# Patient Record
Sex: FEMALE | Race: BLACK OR AFRICAN AMERICAN | Employment: FULL TIME | URBAN - METROPOLITAN AREA
[De-identification: names, ages, dates, MRNs, and addresses within clinical notes are randomized per-mention and may not be internally consistent; named-entity substitution may affect disease eponyms.]

---

## 2020-01-02 ENCOUNTER — HOSPITAL ENCOUNTER (EMERGENCY)
Age: 25
Discharge: HOME | End: 2020-01-02
Attending: EMERGENCY MEDICINE
Payer: MEDICAID

## 2020-01-02 VITALS
TEMPERATURE: 97.8 F | HEART RATE: 74 BPM | RESPIRATION RATE: 16 BRPM | DIASTOLIC BLOOD PRESSURE: 95 MMHG | OXYGEN SATURATION: 100 % | WEIGHT: 287 LBS | BODY MASS INDEX: 46.12 KG/M2 | SYSTOLIC BLOOD PRESSURE: 133 MMHG | HEIGHT: 66 IN

## 2020-01-02 DIAGNOSIS — R20.0 LEFT ARM NUMBNESS: ICD-10-CM

## 2020-01-02 DIAGNOSIS — R20.2 PARESTHESIA: Primary | ICD-10-CM

## 2020-01-02 LAB
ALBUMIN SERPL-MCNC: 3.2 G/DL (ref 3.4–5)
ALBUMIN/GLOB SERPL: 1 {RATIO} (ref 0.8–1.7)
ALP SERPL-CCNC: 75 U/L (ref 45–117)
ALT SERPL-CCNC: 22 U/L (ref 13–56)
AMPHET UR QL SCN: NEGATIVE
ANION GAP SERPL CALC-SCNC: 5 MMOL/L (ref 3–18)
APPEARANCE UR: CLEAR
AST SERPL-CCNC: 20 U/L (ref 10–38)
BACTERIA URNS QL MICRO: ABNORMAL /HPF
BARBITURATES UR QL SCN: NEGATIVE
BASOPHILS # BLD: 0 K/UL (ref 0–0.1)
BASOPHILS NFR BLD: 0 % (ref 0–2)
BENZODIAZ UR QL: NEGATIVE
BILIRUB SERPL-MCNC: 0.3 MG/DL (ref 0.2–1)
BILIRUB UR QL: NEGATIVE
BUN SERPL-MCNC: 14 MG/DL (ref 7–18)
BUN/CREAT SERPL: 15 (ref 12–20)
CALCIUM SERPL-MCNC: 8.6 MG/DL (ref 8.5–10.1)
CANNABINOIDS UR QL SCN: POSITIVE
CHLORIDE SERPL-SCNC: 108 MMOL/L (ref 100–111)
CK MB CFR SERPL CALC: 0.6 % (ref 0–4)
CK MB SERPL-MCNC: 1.4 NG/ML (ref 5–25)
CK SERPL-CCNC: 232 U/L (ref 26–192)
CO2 SERPL-SCNC: 27 MMOL/L (ref 21–32)
COCAINE UR QL SCN: NEGATIVE
COLOR UR: YELLOW
CREAT SERPL-MCNC: 0.91 MG/DL (ref 0.6–1.3)
DIFFERENTIAL METHOD BLD: ABNORMAL
EOSINOPHIL # BLD: 0.1 K/UL (ref 0–0.4)
EOSINOPHIL NFR BLD: 1 % (ref 0–5)
EPITH CASTS URNS QL MICRO: ABNORMAL /LPF (ref 0–5)
ERYTHROCYTE [DISTWIDTH] IN BLOOD BY AUTOMATED COUNT: 14.7 % (ref 11.6–14.5)
GLOBULIN SER CALC-MCNC: 3.3 G/DL (ref 2–4)
GLUCOSE SERPL-MCNC: 92 MG/DL (ref 74–99)
GLUCOSE UR STRIP.AUTO-MCNC: NEGATIVE MG/DL
HCG UR QL: NEGATIVE
HCT VFR BLD AUTO: 41.3 % (ref 35–45)
HDSCOM,HDSCOM: ABNORMAL
HGB BLD-MCNC: 13 G/DL (ref 12–16)
HGB UR QL STRIP: NEGATIVE
KETONES UR QL STRIP.AUTO: NEGATIVE MG/DL
LEUKOCYTE ESTERASE UR QL STRIP.AUTO: NEGATIVE
LYMPHOCYTES # BLD: 2.6 K/UL (ref 0.9–3.6)
LYMPHOCYTES NFR BLD: 36 % (ref 21–52)
MAGNESIUM SERPL-MCNC: 2 MG/DL (ref 1.6–2.6)
MCH RBC QN AUTO: 27.3 PG (ref 24–34)
MCHC RBC AUTO-ENTMCNC: 31.5 G/DL (ref 31–37)
MCV RBC AUTO: 86.6 FL (ref 74–97)
METHADONE UR QL: NEGATIVE
MONOCYTES # BLD: 0.7 K/UL (ref 0.05–1.2)
MONOCYTES NFR BLD: 10 % (ref 3–10)
MUCOUS THREADS URNS QL MICRO: ABNORMAL /LPF
NEUTS SEG # BLD: 3.8 K/UL (ref 1.8–8)
NEUTS SEG NFR BLD: 53 % (ref 40–73)
NITRITE UR QL STRIP.AUTO: NEGATIVE
OPIATES UR QL: NEGATIVE
PCP UR QL: NEGATIVE
PH UR STRIP: 6.5 [PH] (ref 5–8)
PLATELET # BLD AUTO: 257 K/UL (ref 135–420)
PMV BLD AUTO: 10.9 FL (ref 9.2–11.8)
POTASSIUM SERPL-SCNC: 4.1 MMOL/L (ref 3.5–5.5)
PROT SERPL-MCNC: 6.5 G/DL (ref 6.4–8.2)
PROT UR STRIP-MCNC: ABNORMAL MG/DL
RBC # BLD AUTO: 4.77 M/UL (ref 4.2–5.3)
RBC #/AREA URNS HPF: NEGATIVE /HPF (ref 0–5)
SODIUM SERPL-SCNC: 140 MMOL/L (ref 136–145)
SP GR UR REFRACTOMETRY: >1.03 (ref 1–1.03)
TROPONIN I SERPL-MCNC: <0.02 NG/ML (ref 0–0.04)
UROBILINOGEN UR QL STRIP.AUTO: 1 EU/DL (ref 0.2–1)
WBC # BLD AUTO: 7.2 K/UL (ref 4.6–13.2)
WBC URNS QL MICRO: ABNORMAL /HPF (ref 0–5)

## 2020-01-02 PROCEDURE — 81025 URINE PREGNANCY TEST: CPT

## 2020-01-02 PROCEDURE — 93005 ELECTROCARDIOGRAM TRACING: CPT

## 2020-01-02 PROCEDURE — 85025 COMPLETE CBC W/AUTO DIFF WBC: CPT

## 2020-01-02 PROCEDURE — 80053 COMPREHEN METABOLIC PANEL: CPT

## 2020-01-02 PROCEDURE — 81001 URINALYSIS AUTO W/SCOPE: CPT

## 2020-01-02 PROCEDURE — 83735 ASSAY OF MAGNESIUM: CPT

## 2020-01-02 PROCEDURE — 82550 ASSAY OF CK (CPK): CPT

## 2020-01-02 PROCEDURE — 74011250636 HC RX REV CODE- 250/636: Performed by: EMERGENCY MEDICINE

## 2020-01-02 PROCEDURE — 99285 EMERGENCY DEPT VISIT HI MDM: CPT

## 2020-01-02 PROCEDURE — 80307 DRUG TEST PRSMV CHEM ANLYZR: CPT

## 2020-01-02 PROCEDURE — 96374 THER/PROPH/DIAG INJ IV PUSH: CPT

## 2020-01-02 RX ORDER — DEXAMETHASONE SODIUM PHOSPHATE 4 MG/ML
10 INJECTION, SOLUTION INTRA-ARTICULAR; INTRALESIONAL; INTRAMUSCULAR; INTRAVENOUS; SOFT TISSUE ONCE
Status: COMPLETED | OUTPATIENT
Start: 2020-01-02 | End: 2020-01-02

## 2020-01-02 RX ADMIN — DEXAMETHASONE SODIUM PHOSPHATE 10 MG: 4 INJECTION, SOLUTION INTRAMUSCULAR; INTRAVENOUS at 23:18

## 2020-01-03 LAB
ATRIAL RATE: 77 BPM
CALCULATED P AXIS, ECG09: 43 DEGREES
CALCULATED R AXIS, ECG10: 54 DEGREES
CALCULATED T AXIS, ECG11: 4 DEGREES
DIAGNOSIS, 93000: NORMAL
P-R INTERVAL, ECG05: 146 MS
Q-T INTERVAL, ECG07: 388 MS
QRS DURATION, ECG06: 84 MS
QTC CALCULATION (BEZET), ECG08: 439 MS
VENTRICULAR RATE, ECG03: 77 BPM

## 2020-01-03 NOTE — ED PROVIDER NOTES
EMERGENCY DEPARTMENT HISTORY AND PHYSICAL EXAM    Date: 1/2/2020  Patient Name: Gee Farris    History of Presenting Illness     Chief Complaint   Patient presents with    Numbness     to left hand/forearm         History Provided By: Patient      Gee Farris is a 30 y.o. female who presents to the emergency department C/O numbness to left hand and forearm and left leg. 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 She states she did have some intermittent chest discomfort as well when the symptoms initially started but denies any shortness of breath. Denies any headache, weakness, fever, chills, abdominal pain, nausea, vomiting. States she had this problem about a year ago. No other complaints. PCP: None    Current Facility-Administered Medications   Medication Dose Route Frequency Provider Last Rate Last Dose    dexamethasone (DECADRON) 4 mg/mL injection 10 mg  10 mg IntraVENous ONCE Vitaly Doe, DO         Current Outpatient Medications   Medication Sig Dispense Refill    omeprazole (PRILOSEC) 20 mg capsule Take 1 Cap by mouth daily. 30 Cap 1    traMADol (ULTRAM) 50 mg tablet Take 1 Tab by mouth every six (6) hours as needed for Pain. Max Daily Amount: 200 mg. 12 Tab 0    amLODIPine (NORVASC) 5 mg tablet Take 1 Tab by mouth daily. 30 Tab 1    acetaminophen-codeine (TYLENOL-CODEINE #3) 300-30 mg per tablet Take 1 Tab by mouth every six (6) hours as needed for Pain. Max Daily Amount: 4 Tabs. 12 Tab 0       Past History     Past Medical History:  Past Medical History:   Diagnosis Date    Back problem     Hypertension     PIH    Ill-defined condition     positive tb skin tect X 2 in 2013       Past Surgical History:  History reviewed. No pertinent surgical history. Family History:  History reviewed. No pertinent family history.     Social History:  Social History     Tobacco Use    Smoking status: Current Every Day Smoker Types: Cigarettes, Cigars    Smokeless tobacco: Never Used   Substance Use Topics    Alcohol use: Yes     Alcohol/week: 0.0 standard drinks     Comment: twice a month     Drug use: Not Currently     Frequency: 1.0 times per week     Types: Marijuana       Allergies: Allergies   Allergen Reactions    Latex Rash         Review of Systems   Review of Systems   Constitutional: Negative for fever. Respiratory: Negative for shortness of breath. Cardiovascular: Negative for chest pain. Gastrointestinal: Negative for abdominal pain, nausea and vomiting. Musculoskeletal: Negative for arthralgias, back pain and myalgias. Neurological: Positive for numbness. Negative for dizziness, weakness, light-headedness and headaches.          Physical Exam     Vitals:    01/02/20 2143   BP: 129/87   Pulse: 65   Resp: 18   Temp: 97.8 °F (36.6 °C)   SpO2: 100%   Weight: 130.2 kg (287 lb)   Height: 5' 6\" (1.676 m)     Physical Exam    Nursing notes and vital signs reviewed    Constitutional: Non toxic appearing, no acute distress, obese  HEENT: Normocephalic, Atraumatic, Pupils are equal, round, and reactive to light, EOMI  Cardiovascular: Regular rate and rhythm, no murmurs  Chest: Normal work of breathing and chest excursion bilaterally  Lungs: Clear to ausculation bilaterally  Abdomen: Soft, non tender, non distended, normoactive bowel sounds  Back: No evidence of trauma or deformity  Extremities: No evidence of trauma or deformity, no LE edema  Skin: Warm and dry, normal cap refill  Neuro: Alert and oriented x 3, CN intact, normal speech, strength full and symmetric bilaterally, normal coordination, subjective decreased sensation to the ulnar aspect of the left elbow, forearm and the fifth and fourth digit, subjective decrease sensation to the left foot on the lateral aspect up to the popliteal region but no tenderness to these regions  Psychiatric: Normal mood and affect      Diagnostic Study Results     Labs - Recent Results (from the past 72 hour(s))   URINALYSIS W/ RFLX MICROSCOPIC    Collection Time: 01/02/20 10:00 PM   Result Value Ref Range    Color YELLOW      Appearance CLEAR      Specific gravity >1.030 (H) 1.005 - 1.030    pH (UA) 6.5 5.0 - 8.0      Protein TRACE (A) NEG mg/dL    Glucose NEGATIVE  NEG mg/dL    Ketone NEGATIVE  NEG mg/dL    Bilirubin NEGATIVE  NEG      Blood NEGATIVE  NEG      Urobilinogen 1.0 0.2 - 1.0 EU/dL    Nitrites NEGATIVE  NEG      Leukocyte Esterase NEGATIVE  NEG     DRUG SCREEN, URINE    Collection Time: 01/02/20 10:00 PM   Result Value Ref Range    BENZODIAZEPINES NEGATIVE  NEG      BARBITURATES NEGATIVE  NEG      THC (TH-CANNABINOL) POSITIVE (A) NEG      OPIATES NEGATIVE  NEG      PCP(PHENCYCLIDINE) NEGATIVE  NEG      COCAINE NEGATIVE  NEG      AMPHETAMINES NEGATIVE  NEG      METHADONE NEGATIVE  NEG      HDSCOM (NOTE)    URINE MICROSCOPIC ONLY    Collection Time: 01/02/20 10:00 PM   Result Value Ref Range    WBC 0 to 1 0 - 5 /hpf    RBC NEGATIVE  0 - 5 /hpf    Epithelial cells FEW 0 - 5 /lpf    Bacteria FEW (A) NEG /hpf    Mucus FEW (A) NEG /lpf   CBC WITH AUTOMATED DIFF    Collection Time: 01/02/20 10:09 PM   Result Value Ref Range    WBC 7.2 4.6 - 13.2 K/uL    RBC 4.77 4.20 - 5.30 M/uL    HGB 13.0 12.0 - 16.0 g/dL    HCT 41.3 35.0 - 45.0 %    MCV 86.6 74.0 - 97.0 FL    MCH 27.3 24.0 - 34.0 PG    MCHC 31.5 31.0 - 37.0 g/dL    RDW 14.7 (H) 11.6 - 14.5 %    PLATELET 680 723 - 979 K/uL    MPV 10.9 9.2 - 11.8 FL    NEUTROPHILS 53 40 - 73 %    LYMPHOCYTES 36 21 - 52 %    MONOCYTES 10 3 - 10 %    EOSINOPHILS 1 0 - 5 %    BASOPHILS 0 0 - 2 %    ABS. NEUTROPHILS 3.8 1.8 - 8.0 K/UL    ABS. LYMPHOCYTES 2.6 0.9 - 3.6 K/UL    ABS. MONOCYTES 0.7 0.05 - 1.2 K/UL    ABS. EOSINOPHILS 0.1 0.0 - 0.4 K/UL    ABS.  BASOPHILS 0.0 0.0 - 0.1 K/UL    DF AUTOMATED     METABOLIC PANEL, COMPREHENSIVE    Collection Time: 01/02/20 10:09 PM   Result Value Ref Range    Sodium 140 136 - 145 mmol/L    Potassium 4.1 3.5 - 5.5 mmol/L    Chloride 108 100 - 111 mmol/L    CO2 27 21 - 32 mmol/L    Anion gap 5 3.0 - 18 mmol/L    Glucose 92 74 - 99 mg/dL    BUN 14 7.0 - 18 MG/DL    Creatinine 0.91 0.6 - 1.3 MG/DL    BUN/Creatinine ratio 15 12 - 20      GFR est AA >60 >60 ml/min/1.73m2    GFR est non-AA >60 >60 ml/min/1.73m2    Calcium 8.6 8.5 - 10.1 MG/DL    Bilirubin, total 0.3 0.2 - 1.0 MG/DL    ALT (SGPT) 22 13 - 56 U/L    AST (SGOT) 20 10 - 38 U/L    Alk. phosphatase 75 45 - 117 U/L    Protein, total 6.5 6.4 - 8.2 g/dL    Albumin 3.2 (L) 3.4 - 5.0 g/dL    Globulin 3.3 2.0 - 4.0 g/dL    A-G Ratio 1.0 0.8 - 1.7     CARDIAC PANEL,(CK, CKMB & TROPONIN)    Collection Time: 01/02/20 10:09 PM   Result Value Ref Range     (H) 26 - 192 U/L    CK - MB 1.4 <3.6 ng/ml    CK-MB Index 0.6 0.0 - 4.0 %    Troponin-I, QT <0.02 0.0 - 0.045 NG/ML   MAGNESIUM    Collection Time: 01/02/20 10:09 PM   Result Value Ref Range    Magnesium 2.0 1.6 - 2.6 mg/dL   EKG, 12 LEAD, INITIAL    Collection Time: 01/02/20 10:35 PM   Result Value Ref Range    Ventricular Rate 77 BPM    Atrial Rate 77 BPM    P-R Interval 146 ms    QRS Duration 84 ms    Q-T Interval 388 ms    QTC Calculation (Bezet) 439 ms    Calculated P Axis 43 degrees    Calculated R Axis 54 degrees    Calculated T Axis 4 degrees    Diagnosis       Normal sinus rhythm  Possible Left atrial enlargement  T wave abnormality, consider anterior ischemia  Abnormal ECG  When compared with ECG of 13-OCT-2016 05:13,  Vent.  rate has increased BY  26 BPM  QT has lengthened     HCG URINE, QL. - POC    Collection Time: 01/02/20 10:47 PM   Result Value Ref Range    Pregnancy test,urine (POC) NEGATIVE  NEG         Radiologic Studies -   No orders to display     CT Results  (Last 48 hours)    None        CXR Results  (Last 48 hours)    None          Medications given in the ED-  Medications   dexamethasone (DECADRON) 4 mg/mL injection 10 mg (has no administration in time range)         Medical Decision Making I am the first provider for this patient. I reviewed the vital signs, available nursing notes, past medical history, past surgical history, family history and social history. Vital Signs-Reviewed the patient's vital signs. Pulse Oximetry Analysis - 100% on room air     Cardiac Monitor:  Rate: 65 bpm  Rhythm: sinus    EKG interpretation: (Preliminary)  EKG read by Dr. Merlinda Pun 77 normal sinus rhythm, nonspecific T wave changes in the anterior and inferior leads, no ST changes    Records Reviewed: Nursing Notes    Procedures:  Procedures    ED Course:   Laboratory findings unremarkable. I discussed with the patient that her symptoms are likely due to paresthesias. Recommended to continue with supportive care and exercise and follow-up with her primary care physician for reevaluation otherwise come back to the emergency department if her symptoms worsen with weakness. She understands and agrees to plan    Diagnosis and Disposition       DISCHARGE NOTE:    Joshua Gilbert's  results have been reviewed with her. She has been counseled regarding her diagnosis, treatment, and plan. She verbally conveys understanding and agreement of the signs, symptoms, diagnosis, treatment and prognosis and additionally agrees to follow up as discussed. She also agrees with the care-plan and conveys that all of her questions have been answered. I have also provided discharge instructions for her that include: educational information regarding their diagnosis and treatment, and list of reasons why they would want to return to the ED prior to their follow-up appointment, should her condition change. She has been provided with education for proper emergency department utilization. CLINICAL IMPRESSION:    1. Paresthesia    2. Left arm numbness        PLAN:  1. D/C Home  2. Current Discharge Medication List        3.    Follow-up Information     Follow up With Specialties Details Why Contact Info    [facility]  Schedule an appointment as soon as possible for a visit  As needed [id number] [facility], [address] [address]    [facility] EMERGENCY DEPT Emergency Medicine Go to  If symptoms worsen [address] [place] [id number]  [phone]        _______________________________      Please note that this dictation was completed with [employer], the computer voice recognition software. Quite often unanticipated grammatical, syntax, homophones, and other interpretive errors are inadvertently transcribed by the computer software. Please disregard these errors. Please excuse any errors that have escaped final proofreading.

## 2020-01-03 NOTE — ED TRIAGE NOTES
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 Skin warm and dry. Respirations unlabored. Pt anxious.

## 2020-01-03 NOTE — ED NOTES
I have reviewed discharge instructions with the patient. The patient verbalized understanding. Patient armband removed and shredded  Patient d/c home in stable condition, no distress noted.

## 2022-03-18 PROBLEM — R20.0 LEFT ARM NUMBNESS: Status: ACTIVE | Noted: 2020-01-02

## 2022-03-19 PROBLEM — R20.2 PARESTHESIA: Status: ACTIVE | Noted: 2020-01-02

## 2022-06-07 ENCOUNTER — HOSPITAL ENCOUNTER (EMERGENCY)
Age: 27
Discharge: HOME | End: 2022-06-07
Attending: EMERGENCY MEDICINE
Payer: MEDICAID

## 2022-06-07 VITALS
DIASTOLIC BLOOD PRESSURE: 90 MMHG | HEIGHT: 66 IN | HEART RATE: 87 BPM | RESPIRATION RATE: 16 BRPM | OXYGEN SATURATION: 99 % | BODY MASS INDEX: 43.07 KG/M2 | SYSTOLIC BLOOD PRESSURE: 140 MMHG | TEMPERATURE: 97.3 F | WEIGHT: 268 LBS

## 2022-06-07 DIAGNOSIS — M79.604 RIGHT LEG PAIN: ICD-10-CM
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
## 2022-06-07 DIAGNOSIS — S39.012A STRAIN OF LUMBAR REGION, INITIAL ENCOUNTER: ICD-10-CM

## 2022-06-07 PROCEDURE — 99283 EMERGENCY DEPT VISIT LOW MDM: CPT

## 2022-06-07 RX ORDER — TIZANIDINE HYDROCHLORIDE 4 MG/1
CAPSULE, GELATIN COATED ORAL
Qty: 20 CAPSULE | Refills: 0 | Status: SHIPPED | OUTPATIENT
Start: 2022-06-07

## 2022-06-07 RX ORDER — NAPROXEN 500 MG/1
500 TABLET ORAL 2 TIMES DAILY WITH MEALS
Qty: 20 TABLET | Refills: 0 | Status: SHIPPED | OUTPATIENT
Start: 2022-06-07 | End: 2022-06-17

## 2022-06-07 NOTE — DISCHARGE INSTRUCTIONS
Rest.  Expect to be sore for couple days  Work note  Medication as prescribed for pain/inflammation as well as muscle spasm. Make sure that you call your primary care provider for follow-up care  Worse?   Return to ER
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
## 2022-06-07 NOTE — Clinical Note
[facility] [place]  [facility] EMERGENCY DEPT  [address] [facility] [address]  [phone]    Work/School Note    Date: 6/7/2022    To Whom It May concern:    Jad Barton was seen and treated today in the emergency room by the following provider(s):  Attending Provider: Nathan Feng MD  Physician Assistant: SRUTHI Biswas. Carmelalavelle Feng is excused from work/school on 06/07/22 and 06/08/22. She is medically clear to return to work/school on 6/9/2022.        Sincerely,          SRUTHI Couch

## 2022-07-23 ENCOUNTER — APPOINTMENT (OUTPATIENT)
Dept: GENERAL RADIOLOGY | Age: 27
End: 2022-07-23
Attending: STUDENT IN AN ORGANIZED HEALTH CARE EDUCATION/TRAINING PROGRAM
Payer: MEDICAID

## 2022-07-23 ENCOUNTER — HOSPITAL ENCOUNTER (EMERGENCY)
Age: 27
Discharge: HOME | End: 2022-07-23
Attending: STUDENT IN AN ORGANIZED HEALTH CARE EDUCATION/TRAINING PROGRAM
Payer: MEDICAID

## 2022-07-23 VITALS
RESPIRATION RATE: 22 BRPM | TEMPERATURE: 98.4 F | SYSTOLIC BLOOD PRESSURE: 129 MMHG | DIASTOLIC BLOOD PRESSURE: 75 MMHG | OXYGEN SATURATION: 99 % | HEART RATE: 108 BPM

## 2022-07-23 DIAGNOSIS — J45.901 MODERATE ASTHMA WITH ACUTE EXACERBATION, UNSPECIFIED WHETHER PERSISTENT: Primary | ICD-10-CM

## 2022-07-23 LAB
AMORPH CRY URNS QL MICRO: ABNORMAL
ANION GAP SERPL CALC-SCNC: 7 MMOL/L (ref 3–18)
APPEARANCE UR: CLEAR
BACTERIA URNS QL MICRO: NEGATIVE /HPF
BASOPHILS # BLD: 0 K/UL (ref 0–0.1)
BASOPHILS NFR BLD: 1 % (ref 0–2)
BILIRUB UR QL: NEGATIVE
BUN SERPL-MCNC: 10 MG/DL (ref 7–18)
BUN/CREAT SERPL: 12 (ref 12–20)
CALCIUM SERPL-MCNC: 7.9 MG/DL (ref 8.5–10.1)
CHLORIDE SERPL-SCNC: 111 MMOL/L (ref 100–111)
CO2 SERPL-SCNC: 23 MMOL/L (ref 21–32)
COLOR UR: YELLOW
CREAT SERPL-MCNC: 0.84 MG/DL (ref 0.6–1.3)
D DIMER PPP FEU-MCNC: 0.31 UG/ML(FEU)
DIFFERENTIAL METHOD BLD: ABNORMAL
EOSINOPHIL # BLD: 0.1 K/UL (ref 0–0.4)
EOSINOPHIL NFR BLD: 2 % (ref 0–5)
EPITH CASTS URNS QL MICRO: ABNORMAL /LPF (ref 0–5)
ERYTHROCYTE [DISTWIDTH] IN BLOOD BY AUTOMATED COUNT: 13.8 % (ref 11.6–14.5)
GLUCOSE SERPL-MCNC: 97 MG/DL (ref 74–99)
GLUCOSE UR STRIP.AUTO-MCNC: NEGATIVE MG/DL
HCG UR QL: NEGATIVE
HCT VFR BLD AUTO: 37.5 % (ref 35–45)
HGB BLD-MCNC: 12.2 G/DL (ref 12–16)
HGB UR QL STRIP: ABNORMAL
IMM GRANULOCYTES # BLD AUTO: 0 K/UL (ref 0–0.04)
IMM GRANULOCYTES NFR BLD AUTO: 0 % (ref 0–0.5)
KETONES UR QL STRIP.AUTO: NEGATIVE MG/DL
LEUKOCYTE ESTERASE UR QL STRIP.AUTO: NEGATIVE
LYMPHOCYTES # BLD: 2.5 K/UL (ref 0.9–3.6)
LYMPHOCYTES NFR BLD: 53 % (ref 21–52)
MCH RBC QN AUTO: 27.4 PG (ref 24–34)
MCHC RBC AUTO-ENTMCNC: 32.5 G/DL (ref 31–37)
MCV RBC AUTO: 84.1 FL (ref 78–100)
MONOCYTES # BLD: 0.4 K/UL (ref 0.05–1.2)
MONOCYTES NFR BLD: 9 % (ref 3–10)
NEUTS SEG # BLD: 1.6 K/UL (ref 1.8–8)
NEUTS SEG NFR BLD: 35 % (ref 40–73)
NITRITE UR QL STRIP.AUTO: NEGATIVE
NRBC # BLD: 0 K/UL (ref 0–0.01)
NRBC BLD-RTO: 0 PER 100 WBC
PH UR STRIP: 8 [PH] (ref 5–8)
PLATELET # BLD AUTO: 191 K/UL (ref 135–420)
PMV BLD AUTO: 11.5 FL (ref 9.2–11.8)
POTASSIUM SERPL-SCNC: 4.2 MMOL/L (ref 3.5–5.5)
PROT UR STRIP-MCNC: ABNORMAL MG/DL
RBC # BLD AUTO: 4.46 M/UL (ref 4.2–5.3)
RBC #/AREA URNS HPF: ABNORMAL /HPF (ref 0–5)
SODIUM SERPL-SCNC: 141 MMOL/L (ref 136–145)
SP GR UR REFRACTOMETRY: 1.02 (ref 1–1.03)
UROBILINOGEN UR QL STRIP.AUTO: 1 EU/DL (ref 0.2–1)
WBC # BLD AUTO: 4.7 K/UL (ref 4.6–13.2)
WBC URNS QL MICRO: ABNORMAL /HPF (ref 0–4)

## 2022-07-23 PROCEDURE — 74011000258 HC RX REV CODE- 258: Performed by: STUDENT IN AN ORGANIZED HEALTH CARE EDUCATION/TRAINING PROGRAM

## 2022-07-23 PROCEDURE — 93005 ELECTROCARDIOGRAM TRACING: CPT

## 2022-07-23 PROCEDURE — 71045 X-RAY EXAM CHEST 1 VIEW: CPT

## 2022-07-23 PROCEDURE — 81025 URINE PREGNANCY TEST: CPT

## 2022-07-23 PROCEDURE — 74011250636 HC RX REV CODE- 250/636: Performed by: STUDENT IN AN ORGANIZED HEALTH CARE EDUCATION/TRAINING PROGRAM

## 2022-07-23 PROCEDURE — 85379 FIBRIN DEGRADATION QUANT: CPT

## 2022-07-23 PROCEDURE — 74011250637 HC RX REV CODE- 250/637: Performed by: STUDENT IN AN ORGANIZED HEALTH CARE EDUCATION/TRAINING PROGRAM

## 2022-07-23 PROCEDURE — 85025 COMPLETE CBC W/AUTO DIFF WBC: CPT

## 2022-07-23 PROCEDURE — 94762 N-INVAS EAR/PLS OXIMTRY CONT: CPT

## 2022-07-23 PROCEDURE — 74011000250 HC RX REV CODE- 250: Performed by: STUDENT IN AN ORGANIZED HEALTH CARE EDUCATION/TRAINING PROGRAM

## 2022-07-23 PROCEDURE — 99285 EMERGENCY DEPT VISIT HI MDM: CPT

## 2022-07-23 PROCEDURE — 96361 HYDRATE IV INFUSION ADD-ON: CPT

## 2022-07-23 PROCEDURE — 81001 URINALYSIS AUTO W/SCOPE: CPT

## 2022-07-23 PROCEDURE — 94640 AIRWAY INHALATION TREATMENT: CPT

## 2022-07-23 PROCEDURE — 96374 THER/PROPH/DIAG INJ IV PUSH: CPT

## 2022-07-23 PROCEDURE — 80048 BASIC METABOLIC PNL TOTAL CA: CPT

## 2022-07-23 RX ORDER — ACETAMINOPHEN 500 MG
1000 TABLET ORAL
Status: COMPLETED | OUTPATIENT
Start: 2022-07-23 | End: 2022-07-23

## 2022-07-23 RX ORDER — DEXAMETHASONE SODIUM PHOSPHATE 4 MG/ML
12 INJECTION, SOLUTION INTRA-ARTICULAR; INTRALESIONAL; INTRAMUSCULAR; INTRAVENOUS; SOFT TISSUE
Status: DISCONTINUED | OUTPATIENT
Start: 2022-07-23 | End: 2022-07-23

## 2022-07-23 RX ORDER — IPRATROPIUM BROMIDE AND ALBUTEROL SULFATE 2.5; .5 MG/3ML; MG/3ML
3 SOLUTION RESPIRATORY (INHALATION)
Status: COMPLETED | OUTPATIENT
Start: 2022-07-23 | End: 2022-07-23

## 2022-07-23 RX ORDER — ALBUTEROL SULFATE 90 UG/1
2 AEROSOL, METERED RESPIRATORY (INHALATION)
Qty: 1 EACH | Refills: 2 | Status: SHIPPED | OUTPATIENT
Start: 2022-07-23 | End: 2022-07-28

## 2022-07-23 RX ORDER — PREDNISONE 50 MG/1
50 TABLET ORAL DAILY
Qty: 3 TABLET | Refills: 0 | Status: SHIPPED | OUTPATIENT
Start: 2022-07-23 | End: 2022-07-26

## 2022-07-23 RX ADMIN — DEXAMETHASONE SODIUM PHOSPHATE 12 MG: 4 INJECTION INTRA-ARTICULAR; INTRALESIONAL; INTRAMUSCULAR; INTRAVENOUS; SOFT TISSUE at 17:10

## 2022-07-23 RX ADMIN — ACETAMINOPHEN 1000 MG: 500 TABLET ORAL at 15:09

## 2022-07-23 RX ADMIN — SODIUM CHLORIDE 1000 ML: 900 INJECTION, SOLUTION INTRAVENOUS at 14:31

## 2022-07-23 RX ADMIN — IPRATROPIUM BROMIDE AND ALBUTEROL SULFATE 3 ML: .5; 3 SOLUTION RESPIRATORY (INHALATION) at 15:09

## 2022-07-23 NOTE — ED PROVIDER NOTES
64-year-old female with past medical history of asthma, hypertension currently attempting lifestyle modification rather than medication presented via EMS with chief complaint of shortness of breath. Patient endorsed waking at approximately 10 AM feeling slightly short of breath use her albuterol inhaler approximately 3 to 4 x 8 some food and became increasingly short of breath had a slow onset of headache she endorses is now a right-sided 9 out of 10 as well as 3 episodes nonbloody nausea vomiting during EMS ride. Patient was given Zofran via EMS and endorsed improvement in her nausea and vomiting. Patient stated the headache was not sudden or maximal in onset, denied associated neurological symptoms, is not the worst headache of her life, endorsed similar headaches with stress responses. She denied any other associated symptoms or complaints at this time. Past Medical History:   Diagnosis Date    Back problem     Hypertension     PIH    Ill-defined condition     positive tb skin tect X 2 in        No past surgical history on file. No family history on file. Social History     Socioeconomic History    Marital status: SINGLE     Spouse name: Not on file    Number of children: Not on file    Years of education: Not on file    Highest education level: Not on file   Occupational History    Not on file   Tobacco Use    Smoking status: Former     Types: Cigarettes, Cigars     Quit date: 2022     Years since quittin.5    Smokeless tobacco: Never   Substance and Sexual Activity    Alcohol use:  Yes     Alcohol/week: 0.0 standard drinks     Comment: twice a month     Drug use: Not Currently     Frequency: 1.0 times per week     Types: Marijuana    Sexual activity: Yes     Partners: Male   Other Topics Concern     Service No    Blood Transfusions No    Caffeine Concern Not Asked    Occupational Exposure No    Hobby Hazards No    Sleep Concern Yes    Stress Concern Yes  Weight Concern Yes    Special Diet No    Back Care No    Exercise Not Asked    Bike Helmet Not Asked    Seat Belt Not Asked    Self-Exams Not Asked   Social History Narrative    Not on file     Social Determinants of Health     Financial Resource Strain: Not on file   Food Insecurity: Not on file   Transportation Needs: Not on file   Physical Activity: Not on file   Stress: Not on file   Social Connections: Not on file   Intimate Partner Violence: Not on file   Housing Stability: Not on file         ALLERGIES: Latex    Review of Systems   Constitutional:  Negative for chills, diaphoresis and fever. HENT:  Negative for congestion and sore throat. Eyes:  Negative for visual disturbance. Respiratory:  Positive for shortness of breath. Negative for cough. Cardiovascular:  Negative for chest pain. Gastrointestinal:  Positive for nausea. Negative for abdominal pain, blood in stool, diarrhea and vomiting. Genitourinary:  Negative for difficulty urinating, dysuria, frequency and urgency. Musculoskeletal:  Negative for back pain. Skin:  Negative for rash. Neurological:  Positive for headaches. Negative for dizziness, syncope and light-headedness. Hematological:  Negative for adenopathy. Psychiatric/Behavioral:  Negative for confusion. Vitals:    07/23/22 1431 07/23/22 1446 07/23/22 1501 07/23/22 1531   BP:    122/71   Pulse: 95 92 96 (!) 118   Resp: 25 18 22 17   Temp:       SpO2: 99% 100%  100%            Physical Exam  Constitutional:       General: She is not in acute distress. Appearance: Normal appearance. HENT:      Head: Normocephalic and atraumatic. Nose: Nose normal.      Mouth/Throat:      Mouth: Mucous membranes are moist.      Pharynx: Oropharynx is clear. Eyes:      Extraocular Movements: Extraocular movements intact. Pupils: Pupils are equal, round, and reactive to light. Cardiovascular:      Rate and Rhythm: Normal rate and regular rhythm. Pulses: Normal pulses. Heart sounds: Normal heart sounds. Pulmonary:      Effort: Pulmonary effort is normal. No respiratory distress. Breath sounds: Normal breath sounds. Abdominal:      Palpations: Abdomen is soft. Tenderness: There is no abdominal tenderness. Musculoskeletal:         General: No swelling or tenderness. Normal range of motion. Cervical back: Neck supple. Skin:     General: Skin is warm and dry. Capillary Refill: Capillary refill takes less than 2 seconds. Findings: No rash. Neurological:      General: No focal deficit present. Mental Status: She is alert and oriented to person, place, and time. Cranial Nerves: Cranial nerves are intact. Sensory: Sensation is intact. Motor: Motor function is intact. Coordination: Coordination is intact. Gait: Gait is intact. Psychiatric:         Mood and Affect: Mood normal. Affect is tearful. MDM  Number of Diagnoses or Management Options  Moderate asthma with acute exacerbation, unspecified whether persistent  Diagnosis management comments:     2:08 PM  Vital signs notable for blood pressure of 141/92, heart rate in the 90s however patient became slightly tachycardic in the low 100s with standing. Vitals were otherwise within normal limits. Exam was notable for tearful obese female. Exam was otherwise unremarkable to include clear lung sounds bilaterally, and normal neuro exam.  Patient has past medical history of hypertension currently attempting lifestyle modifications rather than medications  Also has past medical history of asthma  Patient was given Zofran via EMS endorsed nausea improved  Patient endorsed currently feeling as though \"her breathing is not very good\"  I cannot PE RC the patient out due to her tachycardia with standing. Ordered CBC, BMP, UA, hCG, chest x-ray, ECG, D-dimer  I will give her 1 L IV fluids and DuoNeb at this time.     4:03 PM  On reassessment patient feels better after DuoNeb, gave her Tylenol p.o. for her headache  UA demonstrated some blood, patient endorsed being on her menstrual cycle currently  D-dimer normal and other labs were unremarkable. Chest x-ray no acute cardiopulmonary process. Will give the patient 12 of Decadron IV at this time  I discussed at length with her and I will prescribe her short course of prednisone as outpatient, and refill her albuterol for her  I discussed with her this was most likely asthma exacerbation and I will discharge her home with above medications, instructions to follow-up with her primary care provider and good return precautions for worsening or worrisome symptoms. She expressed understanding and was on board with the plan.        Amount and/or Complexity of Data Reviewed  Clinical lab tests: reviewed  Tests in the radiology section of CPT®: reviewed           Dr Yola Guardado MD  [facility] EM PGY4

## 2022-07-23 NOTE — Clinical Note
[facility] EMERGENCY DEPT  [id number] [address] [phone]    Work/School Note    Date: 7/23/2022    To Whom It May concern:      Jace Hudson was seen and treated today in the emergency room by the following provider(s):  Resident: Gray Ariza MD.      Jace Hudson is excused from work/school on 07/24/22. She is clear to return to work/school on 07/25/22.         Sincerely,          SRUTHI Adame

## 2022-07-23 NOTE — ED TRIAGE NOTES
Client from home,  reports having difficulty breathing since this am. Client used MDI with no results. Reports having HA enroute and n/v x 1, given zofran ODT enroute.

## 2022-07-24 LAB
ATRIAL RATE: 118 BPM
CALCULATED P AXIS, ECG09: 42 DEGREES
CALCULATED R AXIS, ECG10: 26 DEGREES
CALCULATED T AXIS, ECG11: -23 DEGREES
DIAGNOSIS, 93000: NORMAL
P-R INTERVAL, ECG05: 152 MS
Q-T INTERVAL, ECG07: 344 MS
QRS DURATION, ECG06: 94 MS
QTC CALCULATION (BEZET), ECG08: 482 MS
VENTRICULAR RATE, ECG03: 118 BPM

## 2023-07-24 ENCOUNTER — OFFICE VISIT (OUTPATIENT)
Age: 28
End: 2023-07-24
Payer: MEDICAID

## 2023-07-24 VITALS
DIASTOLIC BLOOD PRESSURE: 92 MMHG | WEIGHT: 293 LBS | HEIGHT: 66 IN | TEMPERATURE: 97.3 F | BODY MASS INDEX: 47.09 KG/M2 | SYSTOLIC BLOOD PRESSURE: 145 MMHG | OXYGEN SATURATION: 100 % | HEART RATE: 86 BPM

## 2023-07-24 DIAGNOSIS — E66.01 MORBID OBESITY WITH BMI OF 50.0-59.9, ADULT (HCC): ICD-10-CM

## 2023-07-24 DIAGNOSIS — K21.9 GASTROESOPHAGEAL REFLUX DISEASE, UNSPECIFIED WHETHER ESOPHAGITIS PRESENT: ICD-10-CM

## 2023-07-24 DIAGNOSIS — E66.01 MORBID OBESITY (HCC): ICD-10-CM

## 2023-07-24 DIAGNOSIS — J45.909 ASTHMA, UNSPECIFIED ASTHMA SEVERITY, UNSPECIFIED WHETHER COMPLICATED, UNSPECIFIED WHETHER PERSISTENT: ICD-10-CM

## 2023-07-24 DIAGNOSIS — I10 HYPERTENSION, UNSPECIFIED TYPE: ICD-10-CM

## 2023-07-24 DIAGNOSIS — E66.01 MORBID OBESITY (HCC): Primary | ICD-10-CM

## 2023-07-24 PROBLEM — M54.9 CHRONIC BACK PAIN: Status: ACTIVE | Noted: 2023-07-24

## 2023-07-24 PROBLEM — F41.9 ANXIETY: Status: ACTIVE | Noted: 2023-07-24

## 2023-07-24 PROBLEM — M41.9 SCOLIOSIS: Status: ACTIVE | Noted: 2023-07-24

## 2023-07-24 PROBLEM — G89.29 CHRONIC BACK PAIN: Status: ACTIVE | Noted: 2023-07-24

## 2023-07-24 PROBLEM — F32.A DEPRESSION: Status: ACTIVE | Noted: 2023-07-24

## 2023-07-24 PROBLEM — J30.2 SEASONAL ALLERGIES: Status: ACTIVE | Noted: 2023-07-24

## 2023-07-24 PROBLEM — F20.9 SCHIZOPHRENIA (HCC): Status: ACTIVE | Noted: 2023-07-24

## 2023-07-24 PROCEDURE — 3077F SYST BP >= 140 MM HG: CPT | Performed by: NURSE PRACTITIONER

## 2023-07-24 PROCEDURE — 3080F DIAST BP >= 90 MM HG: CPT | Performed by: SPECIALIST

## 2023-07-24 PROCEDURE — 99205 OFFICE O/P NEW HI 60 MIN: CPT | Performed by: NURSE PRACTITIONER

## 2023-07-24 RX ORDER — HALOPERIDOL 1 MG/1
1 TABLET ORAL 4 TIMES DAILY
COMMUNITY

## 2023-07-24 RX ORDER — MONTELUKAST SODIUM 10 MG/1
10 TABLET ORAL
COMMUNITY
Start: 2023-02-27

## 2023-07-24 RX ORDER — ALBUTEROL SULFATE 90 UG/1
2 AEROSOL, METERED RESPIRATORY (INHALATION)
COMMUNITY
Start: 2023-02-15

## 2023-07-24 RX ORDER — CETIRIZINE HYDROCHLORIDE 10 MG/1
10 TABLET ORAL DAILY
COMMUNITY
Start: 2023-02-27

## 2023-07-24 RX ORDER — OLOPATADINE HYDROCHLORIDE 1 MG/ML
1 SOLUTION/ DROPS OPHTHALMIC 2 TIMES DAILY
COMMUNITY
Start: 2023-02-27

## 2023-08-09 ENCOUNTER — HOSPITAL ENCOUNTER (OUTPATIENT)
Facility: HOSPITAL | Age: 28
Discharge: HOME | End: 2023-08-12

## 2023-08-09 NOTE — PROGRESS NOTES
Select at least 2 DIFFERENT protein supplements that can be used for protein supplementation to meet goals pre- and post-operatively. - Practice Carbohydrate Counting and label reading.   - Follow 3 g rule for fat and sugar. 2. Slow down meals  - Chew each bite 25-35 times. - Aim for 20-30 min/meal.  3. Increase non-caloric fluids to 64 oz per day. Eliminate caffeine, added sugar, carbonation, and straws.               - Continue to work to decrease sugar sweetened beverages - goal of calorie-free beverages only. - Must eliminate caffeine prior to surgery and avoid for ~6-8 weeks. - Practice 30:30 rule,  food and fluid intake. 4. Start activity regimen, work to increase ADLs. 5. Start Complete MVI and probiotic at least 30 days pre-op.

## 2023-08-10 VITALS — WEIGHT: 293 LBS | BODY MASS INDEX: 47.09 KG/M2 | HEIGHT: 66 IN

## 2023-08-16 ENCOUNTER — HOSPITAL ENCOUNTER (OUTPATIENT)
Facility: HOSPITAL | Age: 28
Setting detail: OUTPATIENT SURGERY
Discharge: HOME | End: 2023-08-19

## 2023-08-16 ENCOUNTER — HOSPITAL ENCOUNTER (OUTPATIENT)
Facility: HOSPITAL | Age: 28
Discharge: HOME | End: 2023-08-19

## 2023-08-16 VITALS
TEMPERATURE: 98.3 F | HEIGHT: 66 IN | HEART RATE: 88 BPM | BODY MASS INDEX: 53.75 KG/M2 | DIASTOLIC BLOOD PRESSURE: 93 MMHG | SYSTOLIC BLOOD PRESSURE: 131 MMHG | OXYGEN SATURATION: 100 % | RESPIRATION RATE: 20 BRPM

## 2023-08-16 DIAGNOSIS — E66.01 MORBID OBESITY (HCC): ICD-10-CM

## 2023-08-16 PROCEDURE — 74240 X-RAY XM UPR GI TRC 1CNTRST: CPT | Performed by: SPECIALIST

## 2023-08-16 PROCEDURE — 74220 X-RAY XM ESOPHAGUS 1CNTRST: CPT

## 2023-08-16 PROCEDURE — 2500000003 HC RX 250 WO HCPCS: Performed by: SPECIALIST

## 2023-08-16 PROCEDURE — 74240 X-RAY XM UPR GI TRC 1CNTRST: CPT

## 2023-08-16 RX ADMIN — BARIUM SULFATE 100 ML: 960 POWDER, FOR SUSPENSION ORAL at 14:10

## 2023-08-16 NOTE — PROCEDURES
Ghazala Singh   : 1995  Medical Record [id number]            PREPROCEDURE DIAGNOSIS: This patient is preoperative for laparoscopic sleeve gastrectomy procedure with a history of  reflux disease. POSTPROCEDURE DIAGNOSIS: This patient is preoperative for laparoscopic sleeve gastrectomy procedure with a history of  reflux disease. PROCEDURES PERFORMED: Upper GI study with barium. ESTIMATED BLOOD LOSS: None. SPECIMENS: None. STATEMENT OF MEDICAL NECESSITY: The patient is a patient with a  longstanding history of obesity. They are now considering the laparoscopic sleeve gastrectomy procedure as a means of surgical weight control and due to their history of reflux disease and are being assessed preoperatively for such. DESCRIPTION OF PROCEDURE: The patient was brought to the fluoroscopy unit and  was given thin barium. On swallowing of barium, they were noted to have  normal peristalsis of their esophagus. They had prompt filling of distal  esophagus with tapering into the gastroesophageal junction. There was no evidence of a hiatal hernia present. Contrast then filled the gastric cardia, fundus,body and pre pyloric region with no abnormalities noted. Contrast then exited the pylorus in normal fashion. No obstruction was noted. There was no evidence of reflux noted.     (normal anatomy)    Maureen Her MD

## 2023-08-16 NOTE — PROGRESS NOTES
[facility] UGI FOCUS NOTE      Date:  8/16/2023  Time:  1:52 PM    Patient:  Shelley Tyler  Procedure:  UGI      Patient Questions  Lap Band Adjustment Patient Questionnaire: If female, are you pregnant?  []Yes     [x]No  Tolerates thick liquids:  [x]Well   []1     []2     []3     []4     []5     []Poorly  Tolerates red meat:  []Well   [x]1     []2     []3     []4     []5     [] Poorly  Tolerates chicken:  [x]Well   []1     []2     []3     []4     []5     []Poorly  Tolerates fish:   [x]Well   []1     []2     []3     []4     []5     []Poorly  Hunger is:   []Well Controlled     []1     []2     [x]3     []4     []5      [] Poorly Controlled  Nightime regurgitation:  [x]Never     []1     []2     []3     []4     []5     []Frequently    Lap Band Info:  Band Type  []Realize     []Realize-C     []AP     []VG     []10cm     []Unknown  []Other      Comments:        Deisi Carlisle RN

## 2023-09-14 ENCOUNTER — HOSPITAL ENCOUNTER (OUTPATIENT)
Facility: HOSPITAL | Age: 28
Discharge: HOME | End: 2023-09-17

## 2023-09-14 VITALS — BODY MASS INDEX: 47.09 KG/M2 | HEIGHT: 66 IN | WEIGHT: 293 LBS

## 2023-10-12 ENCOUNTER — HOSPITAL ENCOUNTER (OUTPATIENT)
Facility: HOSPITAL | Age: 28
Discharge: HOME | End: 2023-10-15

## 2023-10-12 VITALS — WEIGHT: 293 LBS | BODY MASS INDEX: 47.09 KG/M2 | HEIGHT: 66 IN

## 2023-11-09 ENCOUNTER — HOSPITAL ENCOUNTER (OUTPATIENT)
Facility: HOSPITAL | Age: 28
Discharge: HOME | End: 2023-11-12

## 2023-11-09 VITALS — HEIGHT: 66 IN | WEIGHT: 293 LBS | BODY MASS INDEX: 47.09 KG/M2

## 2023-11-09 NOTE — PROGRESS NOTES
changes discussed for both before and after surgery. Today in class we reviewed the Key Diet Principles. Patient was encouraged to consume 3 meals each day, and meal timing was reviewed. Meal time behaviors that will help pt to be successful with their weight loss efforts were also discussed. We discussed the importance of drinking adequate amounts of fluids, recommending that patient consume a minimum of 64 oz of sugar-free, caffeine-free and carbonation-free fluids each day. Patient was encouraged to eliminate sugar-sweetened beverages such as sweet tea, fruit juice, fruit smoothies, flavored coffee drinks and regular sodas. During the weight loss trial, patient is encouraged to focus on eating protein-forward meals, with a daily goal of  grams protein. Patient was also advised to decrease carbohydrate intake to <100 g/d, choosing complex vs. simple carbohydrates in limited amounts. We also discussed limiting fat intake. Encouraged patient to follow the \"3-gram rule\" when choosing foods by looking for items containing <3 g of fat and sugar per serving. We reviewed meal planning guidelines and discussed appropriate meal and snack options. We also talked about appropriate protein drinks and patient was encouraged to start trying these, using them either for a meal replacement or a protein-rich snack pre-operatively. We reviewed the nutritional guidelines for selecting protein shakes. Comments: During today's class we talked about the importance of vitamins and minerals, both pre-operatively and post-operatively. Patient was instructed on:      Pre-operative vitamin and mineral supplementation requirements, according to the ASMBS guidelines:  Patient was instructed to choose a chewable complete multivitamin with iron in NON-gummy form. Selection of probiotic was reviewed.   The role of the following vitamins and minerals that pts need in their multi-vitamin:  Thiamin (Vitamin

## 2023-12-01 ENCOUNTER — OFFICE VISIT (OUTPATIENT)
Age: 28
End: 2023-12-01
Payer: MEDICAID

## 2023-12-01 VITALS
OXYGEN SATURATION: 100 % | WEIGHT: 293 LBS | TEMPERATURE: 97.3 F | DIASTOLIC BLOOD PRESSURE: 72 MMHG | BODY MASS INDEX: 47.09 KG/M2 | SYSTOLIC BLOOD PRESSURE: 126 MMHG | HEART RATE: 68 BPM | HEIGHT: 66 IN

## 2023-12-01 DIAGNOSIS — J45.909 ASTHMA, UNSPECIFIED ASTHMA SEVERITY, UNSPECIFIED WHETHER COMPLICATED, UNSPECIFIED WHETHER PERSISTENT: ICD-10-CM

## 2023-12-01 DIAGNOSIS — K21.9 GASTROESOPHAGEAL REFLUX DISEASE, UNSPECIFIED WHETHER ESOPHAGITIS PRESENT: ICD-10-CM

## 2023-12-01 DIAGNOSIS — E66.01 MORBID OBESITY WITH BMI OF 50.0-59.9, ADULT (HCC): ICD-10-CM

## 2023-12-01 DIAGNOSIS — E66.01 MORBID OBESITY (HCC): Primary | ICD-10-CM

## 2023-12-01 DIAGNOSIS — I10 HYPERTENSION, UNSPECIFIED TYPE: ICD-10-CM

## 2023-12-01 PROCEDURE — 99214 OFFICE O/P EST MOD 30 MIN: CPT | Performed by: NURSE PRACTITIONER

## 2023-12-01 PROCEDURE — 3078F DIAST BP <80 MM HG: CPT | Performed by: NURSE PRACTITIONER

## 2023-12-01 PROCEDURE — 3074F SYST BP LT 130 MM HG: CPT | Performed by: NURSE PRACTITIONER

## 2024-01-09 ENCOUNTER — HOSPITAL ENCOUNTER (OUTPATIENT)
Facility: HOSPITAL | Age: 29
Discharge: HOME | End: 2024-01-12

## 2024-01-09 VITALS — HEIGHT: 66 IN | WEIGHT: 293 LBS | BODY MASS INDEX: 47.09 KG/M2

## 2024-01-09 NOTE — PROGRESS NOTES
Medical Weight Loss Multi-Disciplinary Program    Patient's Name: Geovanni Carter Age: 28 y.o.   YOB: 1995 Sex: female     Session #6. Pt attended in-person class.  Weight obtained in office.    Date: 1/9/2024    Vitals:    01/09/24 1424   Weight: (!) 148.1 kg (326 lb 8 oz)   Height: 1.676 m (5' 6\")      Body mass index is 52.7 kg/m².     Pounds Lost since last month: 9.4 lbs Pounds Gained since last month: 0.0 lbs       Starting Weight: 330 lbs Previous Month’s Weight: 335.9 lbs   Overall Pounds Lost: 3.5 lbs  Overall Pounds Gained: 0.0 lbs     Class Guidelines    Guidelines are reviewed with patient at the start of every class.    1. Patient understands that weight loss trial classes must be consecutive.  Patient understands if they miss a class, it is their responsibility to contact me to reschedule class.  I will reach out to patient after their first no show.  2.  Patient understands the expectations that weight maintenance/weight loss is expected during the classes.  Failure to demonstrate changes may result in extension of weight loss trial, followed by returning to see the surgeon.  Patient understands that they CANNOT gain any weight during the weight loss trial.  Gaining weight will result in extension of weight loss trial.  3. Patient is also instructed to complete their labwork, psychological evaluation visit, and any other tests that the surgeon has ordered while they are working on their weight loss trial.  4.  Patient was instructed to bring their packet of nutrition education materials to every class and appointment.        Eating Habits and Behaviors    Reviewed intake  Understanding low-carbohydrate/low-sugar/low-fat, higher protein meals  Instruction given for personal dietary changes  Discussed perceived compliance    Comments: RD Reviewed Diet History and Physical Activity/Exercise habits.  Recommended dietary changes discussed for both before and after surgery.     Today in class

## 2024-01-31 ENCOUNTER — OFFICE VISIT (OUTPATIENT)
Age: 29
End: 2024-01-31

## 2024-01-31 VITALS
TEMPERATURE: 97.5 F | DIASTOLIC BLOOD PRESSURE: 79 MMHG | HEART RATE: 66 BPM | WEIGHT: 293 LBS | BODY MASS INDEX: 47.09 KG/M2 | SYSTOLIC BLOOD PRESSURE: 121 MMHG | HEIGHT: 66 IN | OXYGEN SATURATION: 100 %

## 2024-01-31 DIAGNOSIS — J45.20 MILD INTERMITTENT ASTHMA WITHOUT COMPLICATION: ICD-10-CM

## 2024-01-31 DIAGNOSIS — E66.01 MORBID OBESITY WITH BMI OF 50.0-59.9, ADULT (HCC): Primary | ICD-10-CM

## 2024-01-31 DIAGNOSIS — E66.01 MORBID OBESITY (HCC): ICD-10-CM

## 2024-01-31 DIAGNOSIS — I10 HYPERTENSION, UNSPECIFIED TYPE: ICD-10-CM

## 2024-01-31 ASSESSMENT — PATIENT HEALTH QUESTIONNAIRE - PHQ9
2. FEELING DOWN, DEPRESSED OR HOPELESS: 0
SUM OF ALL RESPONSES TO PHQ QUESTIONS 1-9: 0
SUM OF ALL RESPONSES TO PHQ QUESTIONS 1-9: 0
1. LITTLE INTEREST OR PLEASURE IN DOING THINGS: 0
SUM OF ALL RESPONSES TO PHQ QUESTIONS 1-9: 0
SUM OF ALL RESPONSES TO PHQ9 QUESTIONS 1 & 2: 0
SUM OF ALL RESPONSES TO PHQ QUESTIONS 1-9: 0

## 2024-01-31 NOTE — PROGRESS NOTES
Pre-Operative Progress Consultation  Original consult in 2023 at 330 lbs  She will only consider a sleeve resection    Subjective:     Geovanni Carter is a 28 y.o. obese female with a Body mass index is 52.7 kg/m²..  she desires surgery at this time because of health issues and quality of life issues.  Geovanni Carter has tried multiple diets in her lifetime most recently tried a physician supervised dietary plan.    Bariatric comorbidities present are   Patient Active Problem List   Diagnosis    Left arm numbness    PIH (pregnancy induced hypertension)    Paresthesia    Morbid obesity with BMI of 50.0-59.9, adult (HCC)    Asthma    Acid reflux    Chronic back pain    Anxiety    Hypertension    Seasonal allergies    Morbid obesity (HCC)    Depression    Scoliosis    Schizophrenia (HCC)     The patient desires sleeve resection for surgical weight loss.  Geovanni Carter is here today to check progress with weight loss / evaluate nutritional status and review all subspecialty clearances in hopes of proceeding to the operating room.     Patient Active Problem List    Diagnosis Date Noted    Morbid obesity with BMI of 50.0-59.9, adult (HCC) 2023    Asthma 2023    Acid reflux 2023    Chronic back pain 2023    Anxiety 2023    Hypertension 2023    Seasonal allergies 2023    Morbid obesity (HCC) 2023    Depression 2023    Scoliosis 2023    Schizophrenia ([facility]) 2023    Left arm numbness 2020    Paresthesia 2020    PIH (pregnancy induced hypertension) 2012      Past Surgical History:   Procedure Laterality Date    TOOTH EXTRACTION        Social History     Tobacco Use    Smoking status: Former     Current packs/day: 0.00     Types: Cigarettes     Quit date: 2022     Years since quittin.0    Smokeless tobacco: Never   Substance Use Topics    Alcohol use: Yes     Alcohol/week: 1.0 standard drink of alcohol     Types: 1 Glasses of wine

## 2024-03-12 DIAGNOSIS — E66.01 MORBID OBESITY (HCC): Primary | ICD-10-CM

## 2024-03-12 DIAGNOSIS — Z01.812 PRE-OPERATIVE LABORATORY EXAMINATION: ICD-10-CM

## 2024-03-12 DIAGNOSIS — I10 HYPERTENSION, UNSPECIFIED TYPE: ICD-10-CM

## 2024-03-12 DIAGNOSIS — E66.01 MORBID OBESITY WITH BMI OF 50.0-59.9, ADULT (HCC): ICD-10-CM

## 2024-06-21 ENCOUNTER — PREP FOR PROCEDURE (OUTPATIENT)
Age: 29
End: 2024-06-21

## 2024-06-21 DIAGNOSIS — I10 HYPERTENSION, ESSENTIAL: ICD-10-CM

## 2024-07-01 ENCOUNTER — OFFICE VISIT (OUTPATIENT)
Age: 29
End: 2024-07-01

## 2024-07-01 ENCOUNTER — HOSPITAL ENCOUNTER (OUTPATIENT)
Facility: HOSPITAL | Age: 29
Discharge: HOME | End: 2024-07-04

## 2024-07-01 ENCOUNTER — CLINICAL DOCUMENTATION (OUTPATIENT)
Age: 29
End: 2024-07-01

## 2024-07-01 VITALS
OXYGEN SATURATION: 100 % | SYSTOLIC BLOOD PRESSURE: 139 MMHG | HEIGHT: 66 IN | TEMPERATURE: 97.3 F | HEART RATE: 93 BPM | WEIGHT: 293 LBS | BODY MASS INDEX: 47.09 KG/M2 | DIASTOLIC BLOOD PRESSURE: 87 MMHG

## 2024-07-01 DIAGNOSIS — F20.9 SCHIZOPHRENIA, UNSPECIFIED TYPE (HCC): ICD-10-CM

## 2024-07-01 DIAGNOSIS — G89.18 POST-OP PAIN: Primary | ICD-10-CM

## 2024-07-01 DIAGNOSIS — I10 HYPERTENSION, UNSPECIFIED TYPE: ICD-10-CM

## 2024-07-01 DIAGNOSIS — M54.50 CHRONIC MIDLINE LOW BACK PAIN WITHOUT SCIATICA: ICD-10-CM

## 2024-07-01 DIAGNOSIS — K21.9 GASTROESOPHAGEAL REFLUX DISEASE, UNSPECIFIED WHETHER ESOPHAGITIS PRESENT: ICD-10-CM

## 2024-07-01 DIAGNOSIS — E66.01 MORBID OBESITY (HCC): Primary | ICD-10-CM

## 2024-07-01 DIAGNOSIS — J45.909 ASTHMA, UNSPECIFIED ASTHMA SEVERITY, UNSPECIFIED WHETHER COMPLICATED, UNSPECIFIED WHETHER PERSISTENT: ICD-10-CM

## 2024-07-01 DIAGNOSIS — G89.29 CHRONIC MIDLINE LOW BACK PAIN WITHOUT SCIATICA: ICD-10-CM

## 2024-07-01 DIAGNOSIS — M41.9 SCOLIOSIS, UNSPECIFIED SCOLIOSIS TYPE, UNSPECIFIED SPINAL REGION: ICD-10-CM

## 2024-07-01 PROBLEM — R20.0 LEFT ARM NUMBNESS: Status: RESOLVED | Noted: 2020-01-02 | Resolved: 2024-07-01

## 2024-07-01 RX ORDER — OXYCODONE HYDROCHLORIDE AND ACETAMINOPHEN 5; 325 MG/1; MG/1
1 TABLET ORAL EVERY 6 HOURS PRN
Qty: 28 TABLET | Refills: 0 | Status: SHIPPED | OUTPATIENT
Start: 2024-07-01 | End: 2024-07-08

## 2024-07-01 RX ORDER — ONDANSETRON 8 MG/1
8 TABLET, ORALLY DISINTEGRATING ORAL EVERY 8 HOURS PRN
Qty: 30 TABLET | Refills: 0 | Status: SHIPPED | OUTPATIENT
Start: 2024-07-01

## 2024-07-01 RX ORDER — ENOXAPARIN SODIUM 100 MG/ML
INJECTION SUBCUTANEOUS
Qty: 28 EACH | Refills: 0 | Status: SHIPPED | OUTPATIENT
Start: 2024-07-01 | End: 2024-07-15

## 2024-07-01 ASSESSMENT — PATIENT HEALTH QUESTIONNAIRE - PHQ9
SUM OF ALL RESPONSES TO PHQ QUESTIONS 1-9: 0
1. LITTLE INTEREST OR PLEASURE IN DOING THINGS: NOT AT ALL
SUM OF ALL RESPONSES TO PHQ QUESTIONS 1-9: 0
2. FEELING DOWN, DEPRESSED OR HOPELESS: NOT AT ALL
SUM OF ALL RESPONSES TO PHQ9 QUESTIONS 1 & 2: 0

## 2024-07-01 NOTE — PROGRESS NOTES
CLINICAL NUTRITION PRE-OPERATIVE EDUCATION    Patient's Name: Geovanni Carter Age: 29 y.o.   YOB: 1995 Sex: female       Education & Materials Provided - Post-op Binder to include:  Liquid Diet Shopping List   Supplemental Resource Guide: MVI, Vitamin B12, Calcium Citrate, Vitamin D, Vitamin B50, and Iron recommendations  Protein Supplement Information   Fluid Requirements/No Straws  No Caffeine or Carbonation   No Alcohol                               No Snacks or No Concentrated Sweets                                   Exercise Guidelines   Key Diet Principles                            Addressed Current Habits/Changes to Make   Patient was instructed on clear liquid diet guidelines to follow for one (1) day prior to surgery.  Patient has been educated on the liquid diet to begin day 2 post-op and continue for 2 weeks  Patient understands the timeline for diet progression and the need to remain on full liquid diet until advanced by provider and dietitian.               Summary:  Patient has completed the required visits with the Registered Dietitian.  During these nutrition visits, we focused on dietary changes, behavior modifications, and the importance of establishing an exercise routine.  The pre-op diet protocol that patient was prescribed emphasized low carbohydrate intake (less than 50 grams per day) and 60-80 grams (g) of protein per day.     At today's session, patient was educated on the post-op diet and vitamin/mineral protocols.  Patient understands the importance of keeping total fat and sugar intake to less than 3g per serving.  Patient is aware of the the timeline for the different stages of the post-op diet and is aware that they will be on a modified full liquid diet for 2 weeks post-op.  Patient understands that the body needs ~60-70 g/d protein.  During the liquid diet phase, patient will need a minimum of 60 g/d protein from supplemental shakes.  Once eating soft protein-rich foods,

## 2024-07-01 NOTE — H&P (VIEW-ONLY)
Sleeve Gastrectomy - History and Physical    Subjective:     The patient is a 29 y.o. obese female with a Body mass index is 52.2 kg/m²..   she presents now to review their work up to date to see if they are a candidate for surgery and whether or not to proceed with the previously requested procedure.  Bariatric comorbidities continue to include:   Patient Active Problem List   Diagnosis    Paresthesia    Asthma    Acid reflux    Chronic back pain    Anxiety    Hypertension    Seasonal allergies    Morbid obesity ([facility])    Depression    Scoliosis    Schizophrenia (formerly [facility])    Body mass index 50.0-59.9, adult (formerly [facility])       They have been generally well prior to this visit and have had no recent significant illnesses. The patient has had no gastrointestinal issues that would preclude them from proceeding with the surgery they have chosen. Geovanni Carter has recently tried a preoperative weight loss program  in addition to seeing a bariatric nutritionist preoperatively. We have discussed on at least one other occasion about the various types of surgical weight loss procedures and they have considered these options after our initial consultation. We have once again discussed these procedures in detail and they have now decided on a surgical procedure.  They present today to discuss this and confirm that their evaluation pre operatively is acceptable to continue with surgery.    The patient desires laparoscopic sleeve gastrectomy for surgical weight loss.      The patients goal weight is 170lb.     These goals are consistent with expected outcomes of their desired operation.  her Medical goals are resolution of these health issues.    Patient Active Problem List    Diagnosis Date Noted    Body mass index 50.0-59.9, adult (formerly [facility]) 06/21/2024    Asthma 07/24/2023    Acid reflux 07/24/2023    Chronic back pain 07/24/2023    Anxiety 07/24/2023    Hypertension 07/24/2023    Seasonal allergies 07/24/2023    Morbid obesity (formerly [facility])

## 2024-07-01 NOTE — PROGRESS NOTES
Sleeve Gastrectomy - History and Physical    Subjective:     The patient is a 29 y.o. obese female with a Body mass index is 52.2 kg/m²..   she presents now to review their work up to date to see if they are a candidate for surgery and whether or not to proceed with the previously requested procedure.  Bariatric comorbidities continue to include:   Patient Active Problem List   Diagnosis    Paresthesia    Asthma    Acid reflux    Chronic back pain    Anxiety    Hypertension    Seasonal allergies    Morbid obesity ([facility])    Depression    Scoliosis    Schizophrenia ([facility])    Body mass index 50.0-59.9, adult ([facility])       They have been generally well prior to this visit and have had no recent significant illnesses. The patient has had no gastrointestinal issues that would preclude them from proceeding with the surgery they have chosen. Geovanni Carter has recently tried a preoperative weight loss program  in addition to seeing a bariatric nutritionist preoperatively. We have discussed on at least one other occasion about the various types of surgical weight loss procedures and they have considered these options after our initial consultation. We have once again discussed these procedures in detail and they have now decided on a surgical procedure.  They present today to discuss this and confirm that their evaluation pre operatively is acceptable to continue with surgery.    The patient desires laparoscopic sleeve gastrectomy for surgical weight loss.      The patients goal weight is 170lb.     These goals are consistent with expected outcomes of their desired operation.  her Medical goals are resolution of these health issues.    Patient Active Problem List    Diagnosis Date Noted    Body mass index 50.0-59.9, adult ([facility]) 06/21/2024    Asthma 07/24/2023    Acid reflux 07/24/2023    Chronic back pain 07/24/2023    Anxiety 07/24/2023    Hypertension 07/24/2023    Seasonal allergies 07/24/2023    Morbid obesity ([facility])

## 2024-07-05 ENCOUNTER — APPOINTMENT (OUTPATIENT)
Facility: HOSPITAL | Age: 29
End: 2024-07-05
Payer: MEDICAID

## 2024-07-05 ENCOUNTER — HOSPITAL ENCOUNTER (OUTPATIENT)
Facility: HOSPITAL | Age: 29
End: 2024-07-05
Payer: MEDICAID

## 2024-07-05 ENCOUNTER — HOSPITAL ENCOUNTER (OUTPATIENT)
Facility: HOSPITAL | Age: 29
Discharge: HOME | End: 2024-07-08

## 2024-07-05 DIAGNOSIS — Z01.812 PRE-OPERATIVE LABORATORY EXAMINATION: ICD-10-CM

## 2024-07-05 DIAGNOSIS — E66.01 MORBID OBESITY WITH BMI OF 50.0-59.9, ADULT (HCC): ICD-10-CM

## 2024-07-05 DIAGNOSIS — I10 HYPERTENSION, UNSPECIFIED TYPE: ICD-10-CM

## 2024-07-05 DIAGNOSIS — E66.01 MORBID OBESITY (HCC): ICD-10-CM

## 2024-07-05 LAB
ABO + RH BLD: NORMAL
BLOOD GROUP ANTIBODIES SERPL: NORMAL
EKG ATRIAL RATE: 94 BPM
EKG DIAGNOSIS: NORMAL
EKG P AXIS: 49 DEGREES
EKG P-R INTERVAL: 148 MS
EKG Q-T INTERVAL: 346 MS
EKG QRS DURATION: 80 MS
EKG QTC CALCULATION (BAZETT): 432 MS
EKG R AXIS: 74 DEGREES
EKG T AXIS: -7 DEGREES
EKG VENTRICULAR RATE: 94 BPM
SENTARA SPECIMEN COLLECTION: NORMAL
SPECIMEN EXP DATE BLD: NORMAL

## 2024-07-05 PROCEDURE — 93005 ELECTROCARDIOGRAM TRACING: CPT

## 2024-07-05 PROCEDURE — 99001 SPECIMEN HANDLING PT-LAB: CPT

## 2024-07-05 PROCEDURE — 86850 RBC ANTIBODY SCREEN: CPT

## 2024-07-05 PROCEDURE — 86900 BLOOD TYPING SEROLOGIC ABO: CPT

## 2024-07-05 PROCEDURE — 86901 BLOOD TYPING SEROLOGIC RH(D): CPT

## 2024-07-05 PROCEDURE — 93010 ELECTROCARDIOGRAM REPORT: CPT | Performed by: INTERNAL MEDICINE

## 2024-07-08 ENCOUNTER — ANESTHESIA EVENT (OUTPATIENT)
Facility: HOSPITAL | Age: 29
DRG: 403 | End: 2024-07-08
Payer: MEDICAID

## 2024-07-09 ENCOUNTER — HOSPITAL ENCOUNTER (INPATIENT)
Facility: HOSPITAL | Age: 29
LOS: 1 days | Discharge: HOME | DRG: 403 | End: 2024-07-10
Attending: SPECIALIST | Admitting: SPECIALIST
Payer: MEDICAID

## 2024-07-09 ENCOUNTER — ANESTHESIA (OUTPATIENT)
Facility: HOSPITAL | Age: 29
DRG: 403 | End: 2024-07-09
Payer: MEDICAID

## 2024-07-09 DIAGNOSIS — Z01.818 PRE-OP TESTING: Primary | ICD-10-CM

## 2024-07-09 PROBLEM — E66.01 MORBID OBESITY WITH BMI OF 50.0-59.9, ADULT (HCC): Status: ACTIVE | Noted: 2024-07-09

## 2024-07-09 LAB
GLUCOSE BLD STRIP.AUTO-MCNC: 114 MG/DL (ref 70–110)
HCG UR QL: NEGATIVE

## 2024-07-09 PROCEDURE — 3600000015 HC SURGERY LEVEL 5 ADDTL 15MIN: Performed by: SPECIALIST

## 2024-07-09 PROCEDURE — 3700000001 HC ADD 15 MINUTES (ANESTHESIA): Performed by: SPECIALIST

## 2024-07-09 PROCEDURE — C1713 ANCHOR/SCREW BN/BN,TIS/BN: HCPCS | Performed by: SPECIALIST

## 2024-07-09 PROCEDURE — 2720000010 HC SURG SUPPLY STERILE: Performed by: SPECIALIST

## 2024-07-09 PROCEDURE — 7100000000 HC PACU RECOVERY - FIRST 15 MIN: Performed by: SPECIALIST

## 2024-07-09 PROCEDURE — 88313 SPECIAL STAINS GROUP 2: CPT

## 2024-07-09 PROCEDURE — 7100000001 HC PACU RECOVERY - ADDTL 15 MIN: Performed by: SPECIALIST

## 2024-07-09 PROCEDURE — 6360000002 HC RX W HCPCS: Performed by: NURSE ANESTHETIST, CERTIFIED REGISTERED

## 2024-07-09 PROCEDURE — 88307 TISSUE EXAM BY PATHOLOGIST: CPT

## 2024-07-09 PROCEDURE — 88305 TISSUE EXAM BY PATHOLOGIST: CPT

## 2024-07-09 PROCEDURE — 6360000002 HC RX W HCPCS: Performed by: SPECIALIST

## 2024-07-09 PROCEDURE — 82962 GLUCOSE BLOOD TEST: CPT

## 2024-07-09 PROCEDURE — 2580000003 HC RX 258: Performed by: SPECIALIST

## 2024-07-09 PROCEDURE — 47100 WEDGE BIOPSY OF LIVER: CPT | Performed by: SPECIALIST

## 2024-07-09 PROCEDURE — 1100000000 HC RM PRIVATE

## 2024-07-09 PROCEDURE — C1781 MESH (IMPLANTABLE): HCPCS | Performed by: SPECIALIST

## 2024-07-09 PROCEDURE — 2500000003 HC RX 250 WO HCPCS: Performed by: NURSE ANESTHETIST, CERTIFIED REGISTERED

## 2024-07-09 PROCEDURE — 2709999900 HC NON-CHARGEABLE SUPPLY: Performed by: SPECIALIST

## 2024-07-09 PROCEDURE — 43775 LAP SLEEVE GASTRECTOMY: CPT | Performed by: SPECIALIST

## 2024-07-09 PROCEDURE — 2500000003 HC RX 250 WO HCPCS: Performed by: SPECIALIST

## 2024-07-09 PROCEDURE — 47379 UNLISTED LAPS PX LIVER: CPT | Performed by: SPECIALIST

## 2024-07-09 PROCEDURE — 3600000005 HC SURGERY LEVEL 5 BASE: Performed by: SPECIALIST

## 2024-07-09 PROCEDURE — 0FB04ZX EXCISION OF LIVER, PERCUTANEOUS ENDOSCOPIC APPROACH, DIAGNOSTIC: ICD-10-PCS | Performed by: SPECIALIST

## 2024-07-09 PROCEDURE — 0DB64Z3 EXCISION OF STOMACH, PERCUTANEOUS ENDOSCOPIC APPROACH, VERTICAL: ICD-10-PCS | Performed by: SPECIALIST

## 2024-07-09 PROCEDURE — 3700000000 HC ANESTHESIA ATTENDED CARE: Performed by: SPECIALIST

## 2024-07-09 PROCEDURE — A4217 STERILE WATER/SALINE, 500 ML: HCPCS | Performed by: SPECIALIST

## 2024-07-09 PROCEDURE — 0DB68ZX EXCISION OF STOMACH, VIA NATURAL OR ARTIFICIAL OPENING ENDOSCOPIC, DIAGNOSTIC: ICD-10-PCS | Performed by: SPECIALIST

## 2024-07-09 PROCEDURE — 81025 URINE PREGNANCY TEST: CPT

## 2024-07-09 PROCEDURE — 6370000000 HC RX 637 (ALT 250 FOR IP): Performed by: SPECIALIST

## 2024-07-09 PROCEDURE — 6360000002 HC RX W HCPCS: Performed by: ANESTHESIOLOGY

## 2024-07-09 RX ORDER — ONDANSETRON 2 MG/ML
INJECTION INTRAMUSCULAR; INTRAVENOUS PRN
Status: DISCONTINUED | OUTPATIENT
Start: 2024-07-09 | End: 2024-07-09 | Stop reason: SDUPTHER

## 2024-07-09 RX ORDER — ROCURONIUM BROMIDE 10 MG/ML
INJECTION, SOLUTION INTRAVENOUS PRN
Status: DISCONTINUED | OUTPATIENT
Start: 2024-07-09 | End: 2024-07-09 | Stop reason: SDUPTHER

## 2024-07-09 RX ORDER — MEPERIDINE HYDROCHLORIDE 50 MG/ML
12.5 INJECTION INTRAMUSCULAR; INTRAVENOUS; SUBCUTANEOUS AS NEEDED
Status: DISCONTINUED | OUTPATIENT
Start: 2024-07-09 | End: 2024-07-09 | Stop reason: HOSPADM

## 2024-07-09 RX ORDER — SUCCINYLCHOLINE/SOD CL,ISO/PF 100 MG/5ML
SYRINGE (ML) INTRAVENOUS PRN
Status: DISCONTINUED | OUTPATIENT
Start: 2024-07-09 | End: 2024-07-09 | Stop reason: SDUPTHER

## 2024-07-09 RX ORDER — PROPOFOL 10 MG/ML
INJECTION, EMULSION INTRAVENOUS PRN
Status: DISCONTINUED | OUTPATIENT
Start: 2024-07-09 | End: 2024-07-09 | Stop reason: SDUPTHER

## 2024-07-09 RX ORDER — NALOXONE HYDROCHLORIDE 0.4 MG/ML
INJECTION, SOLUTION INTRAMUSCULAR; INTRAVENOUS; SUBCUTANEOUS PRN
Status: DISCONTINUED | OUTPATIENT
Start: 2024-07-09 | End: 2024-07-09 | Stop reason: HOSPADM

## 2024-07-09 RX ORDER — SODIUM CHLORIDE 0.9 % (FLUSH) 0.9 %
5-40 SYRINGE (ML) INJECTION PRN
Status: DISCONTINUED | OUTPATIENT
Start: 2024-07-09 | End: 2024-07-09 | Stop reason: HOSPADM

## 2024-07-09 RX ORDER — SODIUM CHLORIDE 0.9 % (FLUSH) 0.9 %
5-40 SYRINGE (ML) INJECTION EVERY 12 HOURS SCHEDULED
Status: DISCONTINUED | OUTPATIENT
Start: 2024-07-09 | End: 2024-07-09 | Stop reason: HOSPADM

## 2024-07-09 RX ORDER — HYDROMORPHONE HYDROCHLORIDE 1 MG/ML
0.5 INJECTION, SOLUTION INTRAMUSCULAR; INTRAVENOUS; SUBCUTANEOUS EVERY 5 MIN PRN
Status: DISCONTINUED | OUTPATIENT
Start: 2024-07-09 | End: 2024-07-09 | Stop reason: HOSPADM

## 2024-07-09 RX ORDER — SODIUM CHLORIDE 9 MG/ML
INJECTION, SOLUTION INTRAVENOUS PRN
Status: DISCONTINUED | OUTPATIENT
Start: 2024-07-09 | End: 2024-07-09 | Stop reason: HOSPADM

## 2024-07-09 RX ORDER — OXYCODONE HYDROCHLORIDE 5 MG/1
5 TABLET ORAL
Status: DISCONTINUED | OUTPATIENT
Start: 2024-07-09 | End: 2024-07-09 | Stop reason: HOSPADM

## 2024-07-09 RX ORDER — MIDAZOLAM HYDROCHLORIDE 1 MG/ML
INJECTION INTRAMUSCULAR; INTRAVENOUS PRN
Status: DISCONTINUED | OUTPATIENT
Start: 2024-07-09 | End: 2024-07-09 | Stop reason: SDUPTHER

## 2024-07-09 RX ORDER — ONDANSETRON 2 MG/ML
4 INJECTION INTRAMUSCULAR; INTRAVENOUS
Status: COMPLETED | OUTPATIENT
Start: 2024-07-09 | End: 2024-07-09

## 2024-07-09 RX ORDER — SODIUM CHLORIDE, SODIUM LACTATE, POTASSIUM CHLORIDE, CALCIUM CHLORIDE 600; 310; 30; 20 MG/100ML; MG/100ML; MG/100ML; MG/100ML
INJECTION, SOLUTION INTRAVENOUS CONTINUOUS
Status: DISCONTINUED | OUTPATIENT
Start: 2024-07-09 | End: 2024-07-09

## 2024-07-09 RX ORDER — KETOROLAC TROMETHAMINE 30 MG/ML
30 INJECTION, SOLUTION INTRAMUSCULAR; INTRAVENOUS EVERY 6 HOURS
Status: DISCONTINUED | OUTPATIENT
Start: 2024-07-09 | End: 2024-07-10 | Stop reason: HOSPADM

## 2024-07-09 RX ORDER — ONDANSETRON 2 MG/ML
4 INJECTION INTRAMUSCULAR; INTRAVENOUS EVERY 6 HOURS PRN
Status: DISCONTINUED | OUTPATIENT
Start: 2024-07-09 | End: 2024-07-10 | Stop reason: HOSPADM

## 2024-07-09 RX ORDER — MAGNESIUM HYDROXIDE 1200 MG/15ML
LIQUID ORAL CONTINUOUS PRN
Status: COMPLETED | OUTPATIENT
Start: 2024-07-09 | End: 2024-07-09

## 2024-07-09 RX ORDER — MORPHINE SULFATE 10 MG/ML
6 INJECTION, SOLUTION INTRAMUSCULAR; INTRAVENOUS
Status: DISCONTINUED | OUTPATIENT
Start: 2024-07-09 | End: 2024-07-10

## 2024-07-09 RX ORDER — LABETALOL HYDROCHLORIDE 5 MG/ML
10 INJECTION, SOLUTION INTRAVENOUS
Status: DISCONTINUED | OUTPATIENT
Start: 2024-07-09 | End: 2024-07-09 | Stop reason: HOSPADM

## 2024-07-09 RX ORDER — FENTANYL CITRATE 50 UG/ML
25 INJECTION, SOLUTION INTRAMUSCULAR; INTRAVENOUS EVERY 5 MIN PRN
Status: DISCONTINUED | OUTPATIENT
Start: 2024-07-09 | End: 2024-07-09 | Stop reason: HOSPADM

## 2024-07-09 RX ORDER — METOCLOPRAMIDE HYDROCHLORIDE 5 MG/ML
INJECTION INTRAMUSCULAR; INTRAVENOUS PRN
Status: DISCONTINUED | OUTPATIENT
Start: 2024-07-09 | End: 2024-07-09 | Stop reason: SDUPTHER

## 2024-07-09 RX ORDER — SODIUM CHLORIDE, SODIUM LACTATE, POTASSIUM CHLORIDE, AND CALCIUM CHLORIDE .6; .31; .03; .02 G/100ML; G/100ML; G/100ML; G/100ML
IRRIGANT IRRIGATION PRN
Status: DISCONTINUED | OUTPATIENT
Start: 2024-07-09 | End: 2024-07-09 | Stop reason: ALTCHOICE

## 2024-07-09 RX ORDER — FENTANYL CITRATE 50 UG/ML
INJECTION, SOLUTION INTRAMUSCULAR; INTRAVENOUS PRN
Status: DISCONTINUED | OUTPATIENT
Start: 2024-07-09 | End: 2024-07-09 | Stop reason: SDUPTHER

## 2024-07-09 RX ORDER — SODIUM CHLORIDE 9 MG/ML
INJECTION, SOLUTION INTRAVENOUS PRN
Status: DISCONTINUED | OUTPATIENT
Start: 2024-07-09 | End: 2024-07-10 | Stop reason: HOSPADM

## 2024-07-09 RX ORDER — DIPHENHYDRAMINE HYDROCHLORIDE 50 MG/ML
12.5 INJECTION INTRAMUSCULAR; INTRAVENOUS
Status: DISCONTINUED | OUTPATIENT
Start: 2024-07-09 | End: 2024-07-09 | Stop reason: HOSPADM

## 2024-07-09 RX ORDER — DROPERIDOL 2.5 MG/ML
0.62 INJECTION, SOLUTION INTRAMUSCULAR; INTRAVENOUS
Status: DISCONTINUED | OUTPATIENT
Start: 2024-07-09 | End: 2024-07-09 | Stop reason: HOSPADM

## 2024-07-09 RX ORDER — LIDOCAINE HYDROCHLORIDE 20 MG/ML
INJECTION, SOLUTION EPIDURAL; INFILTRATION; INTRACAUDAL; PERINEURAL PRN
Status: DISCONTINUED | OUTPATIENT
Start: 2024-07-09 | End: 2024-07-09 | Stop reason: SDUPTHER

## 2024-07-09 RX ORDER — ENOXAPARIN SODIUM 100 MG/ML
40 INJECTION SUBCUTANEOUS ONCE
Status: DISCONTINUED | OUTPATIENT
Start: 2024-07-09 | End: 2024-07-09 | Stop reason: HOSPADM

## 2024-07-09 RX ORDER — SODIUM CHLORIDE 0.9 % (FLUSH) 0.9 %
5-40 SYRINGE (ML) INJECTION PRN
Status: DISCONTINUED | OUTPATIENT
Start: 2024-07-09 | End: 2024-07-10 | Stop reason: HOSPADM

## 2024-07-09 RX ORDER — ONDANSETRON 4 MG/1
4 TABLET, ORALLY DISINTEGRATING ORAL EVERY 8 HOURS PRN
Status: DISCONTINUED | OUTPATIENT
Start: 2024-07-09 | End: 2024-07-10 | Stop reason: HOSPADM

## 2024-07-09 RX ORDER — SODIUM CHLORIDE 0.9 % (FLUSH) 0.9 %
5-40 SYRINGE (ML) INJECTION EVERY 12 HOURS SCHEDULED
Status: DISCONTINUED | OUTPATIENT
Start: 2024-07-09 | End: 2024-07-10 | Stop reason: HOSPADM

## 2024-07-09 RX ORDER — SODIUM CHLORIDE, SODIUM LACTATE, POTASSIUM CHLORIDE, CALCIUM CHLORIDE 600; 310; 30; 20 MG/100ML; MG/100ML; MG/100ML; MG/100ML
INJECTION, SOLUTION INTRAVENOUS CONTINUOUS
Status: DISCONTINUED | OUTPATIENT
Start: 2024-07-09 | End: 2024-07-09 | Stop reason: HOSPADM

## 2024-07-09 RX ORDER — ENOXAPARIN SODIUM 100 MG/ML
40 INJECTION SUBCUTANEOUS EVERY 12 HOURS SCHEDULED
Status: DISCONTINUED | OUTPATIENT
Start: 2024-07-09 | End: 2024-07-10 | Stop reason: HOSPADM

## 2024-07-09 RX ORDER — ACETAMINOPHEN 500 MG
1000 TABLET ORAL ONCE
Status: COMPLETED | OUTPATIENT
Start: 2024-07-09 | End: 2024-07-09

## 2024-07-09 RX ORDER — KETOROLAC TROMETHAMINE 30 MG/ML
INJECTION, SOLUTION INTRAMUSCULAR; INTRAVENOUS PRN
Status: DISCONTINUED | OUTPATIENT
Start: 2024-07-09 | End: 2024-07-09 | Stop reason: SDUPTHER

## 2024-07-09 RX ORDER — SIMETHICONE 80 MG
80 TABLET,CHEWABLE ORAL EVERY 6 HOURS PRN
Status: DISCONTINUED | OUTPATIENT
Start: 2024-07-09 | End: 2024-07-10 | Stop reason: HOSPADM

## 2024-07-09 RX ORDER — SODIUM CHLORIDE, SODIUM LACTATE, POTASSIUM CHLORIDE, CALCIUM CHLORIDE 600; 310; 30; 20 MG/100ML; MG/100ML; MG/100ML; MG/100ML
INJECTION, SOLUTION INTRAVENOUS CONTINUOUS
Status: DISCONTINUED | OUTPATIENT
Start: 2024-07-09 | End: 2024-07-10 | Stop reason: HOSPADM

## 2024-07-09 RX ORDER — IPRATROPIUM BROMIDE AND ALBUTEROL SULFATE 2.5; .5 MG/3ML; MG/3ML
1 SOLUTION RESPIRATORY (INHALATION)
Status: DISCONTINUED | OUTPATIENT
Start: 2024-07-09 | End: 2024-07-09 | Stop reason: HOSPADM

## 2024-07-09 RX ORDER — BUPIVACAINE HYDROCHLORIDE 2.5 MG/ML
INJECTION, SOLUTION EPIDURAL; INFILTRATION; INTRACAUDAL PRN
Status: DISCONTINUED | OUTPATIENT
Start: 2024-07-09 | End: 2024-07-09 | Stop reason: ALTCHOICE

## 2024-07-09 RX ADMIN — SODIUM CHLORIDE, POTASSIUM CHLORIDE, SODIUM LACTATE AND CALCIUM CHLORIDE: 600; 310; 30; 20 INJECTION, SOLUTION INTRAVENOUS at 10:12

## 2024-07-09 RX ADMIN — KETOROLAC TROMETHAMINE 30 MG: 30 INJECTION, SOLUTION INTRAMUSCULAR; INTRAVENOUS at 15:27

## 2024-07-09 RX ADMIN — KETOROLAC TROMETHAMINE 30 MG: 30 INJECTION, SOLUTION INTRAMUSCULAR; INTRAVENOUS at 08:35

## 2024-07-09 RX ADMIN — MORPHINE SULFATE 6 MG: 10 INJECTION INTRAVENOUS at 22:16

## 2024-07-09 RX ADMIN — MIDAZOLAM 2 MG: 1 INJECTION INTRAMUSCULAR; INTRAVENOUS at 07:21

## 2024-07-09 RX ADMIN — WATER 3000 MG: 1 INJECTION INTRAMUSCULAR; INTRAVENOUS; SUBCUTANEOUS at 07:29

## 2024-07-09 RX ADMIN — ONDANSETRON HYDROCHLORIDE 4 MG: 2 INJECTION INTRAMUSCULAR; INTRAVENOUS at 08:31

## 2024-07-09 RX ADMIN — MORPHINE SULFATE 6 MG: 10 INJECTION INTRAVENOUS at 11:58

## 2024-07-09 RX ADMIN — SODIUM CHLORIDE, SODIUM LACTATE, POTASSIUM CHLORIDE, AND CALCIUM CHLORIDE: 600; 310; 30; 20 INJECTION, SOLUTION INTRAVENOUS at 07:59

## 2024-07-09 RX ADMIN — ONDANSETRON 4 MG: 2 INJECTION INTRAMUSCULAR; INTRAVENOUS at 09:22

## 2024-07-09 RX ADMIN — ONDANSETRON 4 MG: 2 INJECTION INTRAMUSCULAR; INTRAVENOUS at 15:28

## 2024-07-09 RX ADMIN — FENTANYL CITRATE 50 MCG: 50 INJECTION, SOLUTION INTRAMUSCULAR; INTRAVENOUS at 07:49

## 2024-07-09 RX ADMIN — HYDROMORPHONE HYDROCHLORIDE 0.5 MG: 1 INJECTION, SOLUTION INTRAMUSCULAR; INTRAVENOUS; SUBCUTANEOUS at 07:21

## 2024-07-09 RX ADMIN — FENTANYL CITRATE 50 MCG: 50 INJECTION, SOLUTION INTRAMUSCULAR; INTRAVENOUS at 07:37

## 2024-07-09 RX ADMIN — NYSTATIN 500000 UNITS: 100000 SUSPENSION ORAL at 06:35

## 2024-07-09 RX ADMIN — SODIUM CHLORIDE, SODIUM LACTATE, POTASSIUM CHLORIDE, AND CALCIUM CHLORIDE: 600; 310; 30; 20 INJECTION, SOLUTION INTRAVENOUS at 09:10

## 2024-07-09 RX ADMIN — SODIUM CHLORIDE, POTASSIUM CHLORIDE, SODIUM LACTATE AND CALCIUM CHLORIDE: 600; 310; 30; 20 INJECTION, SOLUTION INTRAVENOUS at 18:34

## 2024-07-09 RX ADMIN — Medication 140 MG: at 07:31

## 2024-07-09 RX ADMIN — MORPHINE SULFATE 6 MG: 10 INJECTION INTRAVENOUS at 15:28

## 2024-07-09 RX ADMIN — PANTOPRAZOLE SODIUM 40 MG: 40 INJECTION, POWDER, FOR SOLUTION INTRAVENOUS at 19:55

## 2024-07-09 RX ADMIN — KETOROLAC TROMETHAMINE 30 MG: 30 INJECTION, SOLUTION INTRAMUSCULAR; INTRAVENOUS at 19:55

## 2024-07-09 RX ADMIN — HYDROMORPHONE HYDROCHLORIDE 0.5 MG: 1 INJECTION, SOLUTION INTRAMUSCULAR; INTRAVENOUS; SUBCUTANEOUS at 08:51

## 2024-07-09 RX ADMIN — SUGAMMADEX 200 MG: 100 INJECTION, SOLUTION INTRAVENOUS at 08:40

## 2024-07-09 RX ADMIN — PROPOFOL 200 MG: 10 INJECTION, EMULSION INTRAVENOUS at 07:30

## 2024-07-09 RX ADMIN — Medication 12.5 MG: at 18:49

## 2024-07-09 RX ADMIN — ROCURONIUM BROMIDE 50 MG: 10 INJECTION, SOLUTION INTRAVENOUS at 07:40

## 2024-07-09 RX ADMIN — LABETALOL HYDROCHLORIDE 10 MG: 5 INJECTION INTRAVENOUS at 09:32

## 2024-07-09 RX ADMIN — SODIUM CHLORIDE, PRESERVATIVE FREE 20 MG: 5 INJECTION INTRAVENOUS at 06:35

## 2024-07-09 RX ADMIN — ACETAMINOPHEN 1000 MG: 500 TABLET ORAL at 06:34

## 2024-07-09 RX ADMIN — FENTANYL CITRATE 50 MCG: 50 INJECTION, SOLUTION INTRAMUSCULAR; INTRAVENOUS at 07:30

## 2024-07-09 RX ADMIN — LIDOCAINE HYDROCHLORIDE 100 MG: 20 INJECTION, SOLUTION EPIDURAL; INFILTRATION; INTRACAUDAL at 07:29

## 2024-07-09 RX ADMIN — ONDANSETRON 4 MG: 2 INJECTION INTRAMUSCULAR; INTRAVENOUS at 22:17

## 2024-07-09 RX ADMIN — FENTANYL CITRATE 50 MCG: 50 INJECTION, SOLUTION INTRAMUSCULAR; INTRAVENOUS at 08:03

## 2024-07-09 RX ADMIN — ROCURONIUM BROMIDE 10 MG: 10 INJECTION, SOLUTION INTRAVENOUS at 07:30

## 2024-07-09 RX ADMIN — ENOXAPARIN SODIUM 40 MG: 100 INJECTION SUBCUTANEOUS at 17:52

## 2024-07-09 RX ADMIN — SODIUM CHLORIDE, SODIUM LACTATE, POTASSIUM CHLORIDE, AND CALCIUM CHLORIDE: 600; 310; 30; 20 INJECTION, SOLUTION INTRAVENOUS at 06:48

## 2024-07-09 RX ADMIN — METOCLOPRAMIDE 10 MG: 5 INJECTION, SOLUTION INTRAMUSCULAR; INTRAVENOUS at 08:31

## 2024-07-09 RX ADMIN — Medication 12.5 MG: at 11:27

## 2024-07-09 ASSESSMENT — PAIN DESCRIPTION - DESCRIPTORS
DESCRIPTORS: ACHING
DESCRIPTORS: ACHING
DESCRIPTORS: ACHING;SORE
DESCRIPTORS: SORE
DESCRIPTORS: ACHING

## 2024-07-09 ASSESSMENT — PAIN DESCRIPTION - ORIENTATION
ORIENTATION: UPPER;LOWER
ORIENTATION: LOWER;UPPER
ORIENTATION: UPPER;LOWER

## 2024-07-09 ASSESSMENT — PAIN - FUNCTIONAL ASSESSMENT
PAIN_FUNCTIONAL_ASSESSMENT: NONE - DENIES PAIN
PAIN_FUNCTIONAL_ASSESSMENT: ADULT NONVERBAL PAIN SCALE (NPVS)
PAIN_FUNCTIONAL_ASSESSMENT: PREVENTS OR INTERFERES SOME ACTIVE ACTIVITIES AND ADLS
PAIN_FUNCTIONAL_ASSESSMENT: 0-10
PAIN_FUNCTIONAL_ASSESSMENT: ADULT NONVERBAL PAIN SCALE (NPVS)
PAIN_FUNCTIONAL_ASSESSMENT: ACTIVITIES ARE NOT PREVENTED
PAIN_FUNCTIONAL_ASSESSMENT: ADULT NONVERBAL PAIN SCALE (NPVS)
PAIN_FUNCTIONAL_ASSESSMENT: PREVENTS OR INTERFERES WITH MANY ACTIVE NOT PASSIVE ACTIVITIES
PAIN_FUNCTIONAL_ASSESSMENT: ADULT NONVERBAL PAIN SCALE (NPVS)

## 2024-07-09 ASSESSMENT — PAIN DESCRIPTION - LOCATION
LOCATION: ABDOMEN

## 2024-07-09 ASSESSMENT — PAIN DESCRIPTION - PAIN TYPE
TYPE: SURGICAL PAIN
TYPE: SURGICAL PAIN

## 2024-07-09 ASSESSMENT — PAIN DESCRIPTION - FREQUENCY
FREQUENCY: CONTINUOUS
FREQUENCY: CONTINUOUS

## 2024-07-09 ASSESSMENT — PAIN DESCRIPTION - ONSET
ONSET: ON-GOING
ONSET: ON-GOING

## 2024-07-09 ASSESSMENT — PAIN SCALES - GENERAL
PAINLEVEL_OUTOF10: 7
PAINLEVEL_OUTOF10: 8
PAINLEVEL_OUTOF10: 7
PAINLEVEL_OUTOF10: 8
PAINLEVEL_OUTOF10: 6

## 2024-07-09 NOTE — CARE COORDINATION
CM NW made aware pt admitted to the floor s/p LAPAROSCOPIC  SLEEVE GASTRECTOMY. Pt is independent at baseline and has Medicaid insurance. No discharge needs identified, CM available if discharge needs arise.

## 2024-07-09 NOTE — ANESTHESIA PRE PROCEDURE
Department of Anesthesiology  Preprocedure Note       Name:  Geovanni Carter   Age:  29 y.o.  :  1995                                          MRN:  [id number]         Date:  2024      Surgeon: Surgeon(s):  Tayo Rivero MD    Procedure: Procedure(s):  LAPAROSCOPIC  SLEEVE GASTRECTOMY WITH POSSIBLE LIVER WEDGE BIOPSY AND INTRAOPERATIVE ENDOSCOPY    Medications prior to admission:   Prior to Admission medications    Medication Sig Start Date End Date Taking? Authorizing Provider   enoxaparin (LOVENOX) 40 MG/0.4ML Inject 0.4 milliliters subcutaneously every 12 hours  Patient not taking: Reported on 2024 7/1/24 7/15/24  Tayo Rivero MD   ondansetron (ZOFRAN-ODT) 8 MG TBDP disintegrating tablet Place 1 tablet under the tongue every 8 hours as needed for Nausea or Vomiting  Patient not taking: Reported on 2024   Tayo Rivero MD   QUEtiapine (SEROQUEL) 200 MG tablet Take 2 tablets by mouth nightly    ProviderJim MD   NONFORMULARY Inject 400 mg into the skin every 30 days Abilify    ProviderJim MD   albuterol sulfate HFA (PROVENTIL;VENTOLIN;PROAIR) 108 (90 Base) MCG/ACT inhaler Inhale 2 puffs into the lungs as needed  Patient not taking: Reported on 2024 2/15/23   ProviderJim MD       Current medications:    Current Facility-Administered Medications   Medication Dose Route Frequency Provider Last Rate Last Admin   • lactated ringers IV soln infusion   IntraVENous Continuous Tayo Rivero  mL/hr at 24 0648 New Bag at 24 0648   • ceFAZolin Sodium (ANCEF) 3,000 mg in sterile water 30 mL IV syringe  3,000 mg IntraVENous On Call to OR Tayo Rivero MD       • enoxaparin (LOVENOX) injection 40 mg  40 mg SubCUTAneous Once Tayo Rivero MD           Allergies:    Allergies   Allergen Reactions   • Latex Rash       Problem List:    Patient Active Problem List   Diagnosis Code   • Paresthesia R20.2   • Asthma

## 2024-07-09 NOTE — NURSE NAVIGATOR
Patient dozing in and out of sleep throughout visit.  Patient complaining of expected pain with mild nausea.      BP (!) 143/101   Pulse 84   Temp 98.1 °F (36.7 °C) (Axillary)   Resp 18   Ht 1.676 m (5' 5.98\")   Wt (!) 148.1 kg (326 lb 8 oz)   LMP 06/09/2024 (Exact Date)   SpO2 100%   BMI 52.72 kg/m²     General: Alert & oriented to person, place, time, and situation  Abdomen: Appropriate tenderness, soft, non-distended  Lap sites: Within normal limits  DYLAN Drain: Small amount of tan drainage  SCD's: In place and operating within normal limits    Plan:  -Continue medications for symptom management  -Encourage ambulation  -SCD use when in bed  -IS 10 times an hour  -NPO  -UGI in AM    Plan was discussed with the patient, as well as IS teaching provided.  Patient verbalized understanding to plan and education.  Patient completed IS x3 during this visit with no issues nor concerns noted by this RN.  She reached 2,250 mL.

## 2024-07-09 NOTE — OP NOTE
OPERATIVE REPORT         Patient:Geovanni Carter   : 1995  Medical Record Number:[id number]    Pre-operative Diagnosis:  Essential hypertension, malignant [I10]  Morbid obesity (HCC) [E66.01]  BMI 50.0-59.9, adult (HCC) [Z68.43]  Post-operative Diagnosis: * No post-op diagnosis entered *  Procedure: Procedure(s):  1. LAPAROSCOPIC  SLEEVE GASTRECTOMY   2.OVERSEW GASTRIC STAPLE LINE   3. LIVER WEDGE BIOPSY   4. INTRAOPERATIVE ENDOSCOPY WITH BIOPSY  Location: [facility]  Surgeon: Tayo Rivero MD  Assistant:  Nikolas Peña PAC  - (there are no qualified interns, residents, or any other qualified house   physicians available to assist with this surgery) performed retraction of various structures,  assisted in   creation of the gastric sleeve, fired stapling devices, obtained hemostasis along staple lines via hemoclips,       Anesthesia: General       Specimens: 1. Gastric Sleeve Resection                       2. Liver Wedge Biopsy                       3. Endoscopy biopsy    EBL: less than 5cc  Additional Findings: None  Complications: None             STATEMENT OF MEDICAL NECESSITY: The patient is a 29 y.o.-year-old female who has   had a history of obesity. she has failed conservative weight loss measures,   such began to consider weight loss surgical options. she chose the   sleeve gastrectomy as a means of surgical weight control. she has undergone   nutritional and psychological teaching at this time period and does wish to proceed   with sleeve gastrectomy.     OPERATIVE PROCEDURE: The patient was brought to the operating room, placed   on the table in supine position at which time general  anesthesia was administered   without any difficulty. The abdomen was then prepped and draped in the   usual sterile fashion. Using a 15 blade, a 1 cm incision was made just to the   left of the umbilicus. The veress needle approach was used to gain access to   the peritoneal cavity which was then

## 2024-07-09 NOTE — PERIOP NOTE
Reviewed PTA medication list with patient/caregiver and patient/caregiver denies any additional medications.     Patient admits to having a responsible adult care for them at home for at least 24 hours after surgery.    Patient encouraged to use gown warming system and informed that using said warming gown to regulate body temperature prior to a procedure has been shown to help reduce the risks of blood clots and infection.    Patient's pharmacy of choice verified and documented in PTA medication section.    Dual skin assessment & fall risk band verification completed with Keyonna LAWSON RN.

## 2024-07-09 NOTE — ANESTHESIA POSTPROCEDURE EVALUATION
Department of Anesthesiology  Postprocedure Note    Patient: Geovanni Carter  MRN: [id number]  YOB: 1995  Date of evaluation: 7/9/2024    Procedure Summary       Date: 07/09/24 Room / Location: [facility] MAIN 01 / [facility] MAIN OR    Anesthesia Start: 0725 Anesthesia Stop: 0900    Procedure: 1. LAPAROSCOPIC  SLEEVE GASTRECTOMY 2.OVERSEWN GASTRIC STAPLE LINE 3. LIVER WEDGE BIOPSY 4. INTRAOPERATIVE ENDOSCOPY (Abdomen) Diagnosis:       Essential hypertension, malignant      Morbid obesity (HCC)      BMI 50.0-59.9, adult (HCC)      (Essential hypertension, malignant [I10])      (Morbid obesity (HCC) [E66.01])      (BMI 50.0-59.9, adult (HCC) [Z68.43])    Surgeons: Tayo Rivero MD Responsible Provider: Holland Armendariz MD    Anesthesia Type: General ASA Status: 3            Anesthesia Type: General    Grecia Phase I: Grecia Score: 6    Grecai Phase II:      Anesthesia Post Evaluation    Patient location during evaluation: bedside  Patient participation: complete - patient participated  Level of consciousness: awake  Airway patency: patent  Nausea & Vomiting: no nausea and no vomiting  Cardiovascular status: hemodynamically stable  Respiratory status: acceptable  Hydration status: stable  Pain management: satisfactory to patient    No notable events documented.

## 2024-07-09 NOTE — FLOWSHEET NOTE
Dr Armendariz at bedside. Pt ok to go the jacquelyn per Dr Armendariz.  BP (!) 151/94   Pulse 89   Temp 97.8 °F (36.6 °C) (Temporal)   Resp 15

## 2024-07-09 NOTE — FLOWSHEET NOTE
Transferred Pt to [address] [place] [address] at RN at bedside. Dressing CDI. Left pt stable.      07/09/24 0957   Vitals   Temp 98.3 °F (36.8 °C)   Temp Source Axillary   Pulse 88   BP (!) 145/99   SpO2 100 %

## 2024-07-09 NOTE — INTERVAL H&P NOTE
Update History & Physical    The patient's History and Physical of July 1, 2024 was reviewed with the patient and I examined the patient. There was no change. The surgical site was confirmed by the patient and me.     Plan: The risks, benefits, expected outcome, and alternative to the recommended procedure have been discussed with the patient. Patient understands and wants to proceed with the procedure.     Electronically signed by Tayo Rivero MD on 7/9/2024 at 7:23 AM

## 2024-07-10 ENCOUNTER — TELEPHONE (OUTPATIENT)
Age: 29
End: 2024-07-10

## 2024-07-10 ENCOUNTER — APPOINTMENT (OUTPATIENT)
Facility: HOSPITAL | Age: 29
DRG: 403 | End: 2024-07-10
Attending: SPECIALIST
Payer: MEDICAID

## 2024-07-10 VITALS
SYSTOLIC BLOOD PRESSURE: 135 MMHG | DIASTOLIC BLOOD PRESSURE: 86 MMHG | OXYGEN SATURATION: 100 % | RESPIRATION RATE: 17 BRPM | WEIGHT: 293 LBS | HEART RATE: 89 BPM | BODY MASS INDEX: 47.09 KG/M2 | TEMPERATURE: 97.9 F | HEIGHT: 66 IN

## 2024-07-10 PROCEDURE — 2500000003 HC RX 250 WO HCPCS: Performed by: SPECIALIST

## 2024-07-10 PROCEDURE — 2580000003 HC RX 258: Performed by: SPECIALIST

## 2024-07-10 PROCEDURE — 6360000002 HC RX W HCPCS: Performed by: SPECIALIST

## 2024-07-10 PROCEDURE — 2700000000 HC OXYGEN THERAPY PER DAY

## 2024-07-10 PROCEDURE — 6370000000 HC RX 637 (ALT 250 FOR IP): Performed by: SPECIALIST

## 2024-07-10 PROCEDURE — 74240 X-RAY XM UPR GI TRC 1CNTRST: CPT

## 2024-07-10 RX ORDER — ACETAMINOPHEN 325 MG/1
650 TABLET ORAL EVERY 6 HOURS PRN
Status: DISCONTINUED | OUTPATIENT
Start: 2024-07-10 | End: 2024-07-10 | Stop reason: HOSPADM

## 2024-07-10 RX ORDER — OXYCODONE HYDROCHLORIDE AND ACETAMINOPHEN 5; 325 MG/1; MG/1
1 TABLET ORAL EVERY 4 HOURS PRN
Status: DISCONTINUED | OUTPATIENT
Start: 2024-07-10 | End: 2024-07-10 | Stop reason: HOSPADM

## 2024-07-10 RX ORDER — OMEPRAZOLE 20 MG/1
20 TABLET, DELAYED RELEASE ORAL DAILY
Qty: 30 TABLET | Refills: 1 | Status: SHIPPED | OUTPATIENT
Start: 2024-07-10

## 2024-07-10 RX ADMIN — OXYCODONE HYDROCHLORIDE AND ACETAMINOPHEN 1 TABLET: 5; 325 TABLET ORAL at 09:40

## 2024-07-10 RX ADMIN — ENOXAPARIN SODIUM 40 MG: 100 INJECTION SUBCUTANEOUS at 06:27

## 2024-07-10 RX ADMIN — MORPHINE SULFATE 6 MG: 10 INJECTION INTRAVENOUS at 01:17

## 2024-07-10 RX ADMIN — KETOROLAC TROMETHAMINE 30 MG: 30 INJECTION, SOLUTION INTRAMUSCULAR; INTRAVENOUS at 02:55

## 2024-07-10 RX ADMIN — MORPHINE SULFATE 6 MG: 10 INJECTION INTRAVENOUS at 04:29

## 2024-07-10 RX ADMIN — KETOROLAC TROMETHAMINE 30 MG: 30 INJECTION, SOLUTION INTRAMUSCULAR; INTRAVENOUS at 08:23

## 2024-07-10 RX ADMIN — SODIUM CHLORIDE, PRESERVATIVE FREE 10 ML: 5 INJECTION INTRAVENOUS at 08:24

## 2024-07-10 RX ADMIN — ONDANSETRON 4 MG: 2 INJECTION INTRAMUSCULAR; INTRAVENOUS at 04:40

## 2024-07-10 RX ADMIN — BARIUM SULFATE 20 G: 960 POWDER, FOR SUSPENSION ORAL at 08:04

## 2024-07-10 RX ADMIN — SODIUM CHLORIDE, POTASSIUM CHLORIDE, SODIUM LACTATE AND CALCIUM CHLORIDE: 600; 310; 30; 20 INJECTION, SOLUTION INTRAVENOUS at 08:06

## 2024-07-10 RX ADMIN — SIMETHICONE 80 MG: 80 TABLET, CHEWABLE ORAL at 08:23

## 2024-07-10 RX ADMIN — SODIUM CHLORIDE, POTASSIUM CHLORIDE, SODIUM LACTATE AND CALCIUM CHLORIDE: 600; 310; 30; 20 INJECTION, SOLUTION INTRAVENOUS at 03:00

## 2024-07-10 ASSESSMENT — PAIN DESCRIPTION - LOCATION
LOCATION: ABDOMEN

## 2024-07-10 ASSESSMENT — PAIN - FUNCTIONAL ASSESSMENT

## 2024-07-10 ASSESSMENT — PAIN SCALES - GENERAL
PAINLEVEL_OUTOF10: 8
PAINLEVEL_OUTOF10: 6
PAINLEVEL_OUTOF10: 4
PAINLEVEL_OUTOF10: 5
PAINLEVEL_OUTOF10: 9
PAINLEVEL_OUTOF10: 5
PAINLEVEL_OUTOF10: 8
PAINLEVEL_OUTOF10: 7

## 2024-07-10 ASSESSMENT — PAIN DESCRIPTION - DESCRIPTORS
DESCRIPTORS: SORE;CRAMPING
DESCRIPTORS: SORE
DESCRIPTORS: ACHING
DESCRIPTORS: ACHING;SORE;CRAMPING
DESCRIPTORS: SHARP
DESCRIPTORS: ACHING;CRAMPING;SORE
DESCRIPTORS: SHARP

## 2024-07-10 ASSESSMENT — PAIN DESCRIPTION - ORIENTATION
ORIENTATION: MID
ORIENTATION: UPPER;LOWER
ORIENTATION: LOWER;UPPER
ORIENTATION: UPPER;LOWER
ORIENTATION: UPPER;LOWER

## 2024-07-10 ASSESSMENT — PAIN DESCRIPTION - PAIN TYPE
TYPE: SURGICAL PAIN

## 2024-07-10 NOTE — DISCHARGE INSTRUCTIONS
[facility]  Tayo Rivero M.D., F.A.C.S.  [address]  [address]  [place]   [address]  Office: [phone]    Fax:  [phone]    Discharge Instruction for Gastric Bypass / Sleeve Gastrectomy Patients    Diet:  Continue with the liquid diet until you are seen in the office.  Make sure you sip fluids all day. Goal for total fluid intake is at least 64 ounces per day. Aim for  Gms of protein every day.    Activity:  Walking is encouraged.  Rest when you are tired.  You may shower.  You may climb stairs.  Take your time.  No lifting more than 10-15 lbs.  If you feel discomfort during an activity, rest.  Do not drive for 1 week or until off of all narcotics.     Wound Care:  Clean incisions with soap and water when in the shower. Pat dry.  Leave steri-strips on until they fall off.  A small amount of drainage may be present from the incisions.  Contact the office if you notice an increase in drainage, an odor, increased redness, or fever > 100.5.    Medications:  You will receive a prescription for pain medication and Prilosec at the time of discharge.  For upset stomach you may take over the counter medications such as Maalox, Mylanta, or Pepto Bismol.  Gas X works very well for bloating when eating or drinking  You may take Tylenol  Non-aspirin based arthritis medications may be resumed   Take a children’s or adult chewable multivitamin.  Milk of Magnesia will help with constipation.  It is fine to take your usual home medications.  Blood pressure medications should be continued after surgery.  Diabetic medications can frequently be reduced very quickly after surgery.  Diabetics should continue to monitor blood sugars frequently after surgery and contact the prescribing physician for any questions.    Follow-Up Appointment:  If you do not already have a follow-up appointment scheduled, contact the office in the next few days to obtain one.  It is usually scheduled for

## 2024-07-10 NOTE — PROCEDURES
[facility]    Upper GI Procedure Report      Geovanni Carter    Medical Record Number:[id number]    1995    Date of Service - July 10, 2024    Pre-Op Diagnosis - patient is status post sleeve resection performed by myself 24 hours ago. They now present for UGI to assess their post surgical anatomy.    Post-Op Diagnosis -same    Procedure - UGI study with gastrografin    Surgeon - Tayo Rivero MD    Assistant - None    Complications - None    Specimens - None    Implants - None    Estimate Blood Loss - None    Statement of Medical Necessity - Need for radiologic evaluation prior to further management of their care..    Procedure -the patient was brought to the fluoroscopy suite where they were given gastrografin.  On swallowing the contrast the patient was noted to have normal peristalsis of their esophagus with progressive flow into the distal esophagus.  Specific findings of the distal esophagus revealed that they did not have a hiatal hernia. Contrast flowed normally through the esophagus and into a properly sized sleeve stomach without reflux or obstruction or signs of stricture. The stomach filled in a timely manner and emptied into the duodenum without issue or hesitation. The anatomy was normal for the timeframe with no stricture or obstruction or any other abnormality.  Given the benign findings of today's exam we will proceed with liquid diet and start PO medications.    Tayo Rivero MD

## 2024-07-10 NOTE — PLAN OF CARE
Problem: Safety - Adult  Goal: Free from fall injury  7/10/2024 1352 by Oxana Kelly RN  Outcome: Adequate for Discharge  7/10/2024 0613 by Kasey Hernandez RN  Outcome: Progressing     Problem: Pain  Goal: Verbalizes/displays adequate comfort level or baseline comfort level  Outcome: Adequate for Discharge     Problem: Discharge Planning  Goal: Discharge to home or other facility with appropriate resources  7/10/2024 1352 by Oxana Kelly RN  Outcome: Adequate for Discharge  7/10/2024 0613 by Kasey Hernandez RN  Outcome: Progressing

## 2024-07-10 NOTE — PROGRESS NOTES
0700: Bedside report received from Kasey RN (offgoing nurse) to NOAH Dixon (oncoming nurse), bedside report included nurse Handoff Report, MAR, & labs, Pt observed resting in bed at safe position, Pt without any issues. Call light within reach, ongoing monitoring in place.    0700: Pt made this nurse aware that \"I am not ready to go home\" this nurse assessed Pt's concerns, Pt stated, \"I'm in pain and I'm still nauseous\" education reinforced r/t expected pain & nausea s/p surgery, This nurse offered to give Mylicon and encouraged Pt to continue to ambulate to relieve gas pains, Pt was also made aware of expected home medications (analgesic & antiemetic) that will be prescribed by MD, Pt verbalized understanding of all education and is in agreement to ambulate more throughout the day, Pt is stable at this moment, resting in bed at safe position with call bell within reach, ongoing monitoring in place.      0754: Shift assessment complete, Patient A&O x4, no c/o chest pain or SOB, IS use encouraged, HS S1S2 stable and reg, LS CTA in all lobes bilaterally A&P, no c/o n/v noted at this moment, + flatus & belching noted at this moment. Bs x4 normoactive, ABD is soft & tender r/t surgical incisions ,SCD & SAMY to BLE, x5 trochar site dressings (gauze & tape) & DYLAN site drainage gauze to abdomen are C/D/I. DYLAN drain is intact & patent draining brown/clear drainage, 18g IV noted to Rt FA is C/D/I, LR infusing @ 75 ml/hr w/o complications noted at this moment, +CSM,  + 2 palpable peripheral pulses noted in all extremities, Pain 8/10, ice applied to site, Pt educated on IS use, diet (bariatric full diet), importance of ambulation & pain management, Pt verbalized understanding, no concerns voiced. Pt resting in bed at safe position, call bell within reach, ongoing monitoring in place.       0804: DYLAN drain removed w/o complication, skin around site is C/D/I, all sutures removed w/o complications, area is clear of sutures, Pt 
1925- Bedside and Verbal shift change report given to NOAH Parks (oncoming nurse) by NOAH Dixon (offgoing nurse). Report included the following information Nurse Handoff Report, Adult Overview, Surgery Report, Intake/Output, MAR, and Recent Results.     1955- Bedside and Verbal shift change report given to NOAH Parks (oncoming nurse) by NOAH Dixon (offgoing nurse). Report included the following information Nurse Handoff Report, Adult Overview, Surgery Report, Intake/Output, MAR, and Recent Results.     2214- Pt ambulated to the hallway with steady gait.    0054- Pt ambulated to the hallway with steady gait.    0705- Bedside and Verbal shift change report given to NOAH Dixon (oncoming nurse) by NOAH Parks (offgoing nurse). Report included the following information Nurse Handoff Report, Adult Overview, Surgery Report, Intake/Output, MAR, and Recent Results.     
AVS reviewed with pt, all questions answered. Pt reeducated on hydration, lovenox administration, site care, medication uses and side effects, and physician discharge instructions. Pt verbalized understanding. IV removed, no complications.   
Bariatric Surgery                POD #1    /83   Pulse 95   Temp 98.5 °F (36.9 °C) (Oral)   Resp 16   Ht 1.676 m (5' 5.98\")   Wt (!) 148.1 kg (326 lb 8 oz)   LMP 06/09/2024 (Exact Date)   SpO2 100%   BMI 52.72 kg/m²   Patient has minimal complaints of pain, minimal nausea noted     Exam:  Appears well in no distress  Lungs- clear bilaterally  Abd - soft, incisions look good without erythema           DYLAN with minimal serosanguinous output  Extremities- no new edema or swelling    UGI - no obstrustion or leak    Data Review:    Labs: Results:       Chemistry No results for input(s): \"GLU\", \"NA\", \"K\", \"CL\", \"CO2\", \"BUN\", \"TP\", \"GLOB\" in the last 72 hours.    Invalid input(s): \"CREA\", \"CA\", \"AGAP\", \"BUCR\", \"TBIL\", \"GPT\", \"AP\", \"ALB\", \"AGRAT\"   CBC w/Diff No results for input(s): \"WBC\", \"RBC\", \"HGB\", \"HCT\", \"PLT\", \"RETIC\" in the last 72 hours.    Invalid input(s): \"GRANS\", \"LYMPH\", \"EOS\"   Coagulation No results for input(s): \"INR\", \"APTT\" in the last 72 hours.    Invalid input(s): \"PTP\"    Liver Enzymes No results for input(s): \"TP\" in the last 72 hours.    Invalid input(s): \"ALB\", \"TBIL\", \"AP\", \"SGOT\", \"GPT\", \"DBIL\"       Assessment/Plan: S/P   laparoscopic sleeve gastrectomy - doing well without any issues    1.Start bariatric diet and protein shakes  2.D/C IV pain meds and start PO pain meds  3.D/C DYLAN drain  4.Likley PM D/C if  Cont ok and tolerate PO     
steps with clear understanding     1800: This RN attempted to obtain Pt's belongings from locker via security, Per security, \"will get items tomorrow\" due to unavailability of staff    1910: Report given to NOAH Parks (oncoming nurse) by NOAH Dixon (offoing nurse), bedside report included Nurse Handoff Report, MAR, & labs.

## 2024-07-10 NOTE — NURSE NAVIGATOR
Patient sitting on side of bed sipping protein drink and tolerating well.      Vitals:   Blood pressure 131/83, pulse 91, temperature 98 °F (36.7 °C), temperature source Oral, resp. rate 18, height 1.676 m (5' 5.98\"), weight (!) 148.1 kg (326 lb 8 oz), last menstrual period 06/09/2024, SpO2 100 %.  Output: reviewed, and patient verified urinating clear, yellow urine.  Abdomen: Lap sites without erythema, swelling,  and/or drainage.  DYLAN drain removed.    SCD's: Positioned and operating WNL    Patient with expected pain, but is being managed and is currently tolerable.  No nausea and/or vomiting.  Patient has been ambulating the halls.  Patient is able to swallow pills.    Post-op diet progression discussed with patient.  Patient to be discharged on a bariatric full liquid diet.  Patient verbalized understanding of liquid diet for next two weeks until first post-op visit.  Goal of four ounces per hour with one ounce being protein was clearly understood. Medications were discussed (i.e., pain- Percocet, Tylenol, not to use aspirin or ibuprofen based products, as well as steroids).  Constipation was discussed and ways to alleviate it were discussed.  Education completed on IS use and to ambulate every hour when at home. Patient completed a return demonstration on IS with no issues or concerns from this RN.  Lovenox and Percocet filled and in the home.  Lovenox and Percocet education provided, and patient verbalized understanding.   Patient has chewable multi-vitamin, probiotic, and Prilosec in the home; they were reviewed.  Ketosis was also reviewed.  Patient given a report card to record intake and a handout to support bedside education.  All questions were answered by this RN, and patient verbalized understanding to all education provided.  Goals for discharge were discussed, and patient verbalized understanding.  Post-op follow-up appt. maggy scheduled.

## 2024-07-10 NOTE — DISCHARGE SUMMARY
Discharge Summary    Patient: Geovanni Carter               Sex: female          DOA: 7/9/2024         YOB: 1995      Age:  29 y.o.        LOS:  LOS: 1 day                Admit Date: 7/9/2024    Discharge Date: 7/10/2024    Admission Diagnoses: Essential hypertension, malignant [I10]  Morbid obesity (HCC) [E66.01]  BMI 50.0-59.9, adult (HCC) [Z68.43]  Morbid obesity with BMI of 50.0-59.9, adult (HCC) [E66.01, Z68.43]    Discharge Diagnoses:      Discharge Condition: Good    Hospital Course: The patient underwent sleeve resection  on 7/9/2024. The patient tolerated the procedure well. Vital signs remained stable and the patient was transferred to  3rd floor surgical unit without complications. The patient remained stable throughout the first night post operatively with stable vital signs and adequate urine output and pain control. Pain was controlled with Dilaudid IV and IV Tylenol .  The patient on the first morning post operative was transferred to the radiology suite where they underwent a gastrograffin UGI study which showed no evidence of a leak or stricture. The drain was discontinued on POD # 1 and the patient was started on a bariatric liquid diet with protein shakes. The patient progressed throughout the day and was ambulating well and tolerating their diet. They were therefore discharged home with instructions to notify me with any issues that may arise.    Significant Diagnostic Studies:   No results for input(s): \"HGB\" in the last 72 hours.  No results for input(s): \"HCT\" in the last 72 hours.       Medication List        CONTINUE taking these medications      QUEtiapine 200 MG tablet  Commonly known as: SEROQUEL            ASK your doctor about these medications      albuterol sulfate  (90 Base) MCG/ACT inhaler  Commonly known as: PROVENTIL;VENTOLIN;PROAIR     enoxaparin 40 MG/0.4ML  Commonly known as: LOVENOX  Inject 0.4 milliliters subcutaneously every 12 hours     NONFORMULARY

## 2024-07-11 ENCOUNTER — TELEPHONE (OUTPATIENT)
Age: 29
End: 2024-07-11

## 2024-07-11 NOTE — TELEPHONE ENCOUNTER
RN left a voicemail message in an attempt to follow-up post-operatively.  RN requested a return call.

## 2024-07-12 ENCOUNTER — TELEPHONE (OUTPATIENT)
Age: 29
End: 2024-07-12

## 2024-07-12 NOTE — TELEPHONE ENCOUNTER
This RN spoke with patient post-operatively.     Hydration: Patient hydrated with 59 ounces as of yesterday.    Nausea and/or vomitting: None    Pain: Currently managed with Percocet     Lovenox injections: Administering every 12 hours, rotating sites.  RN reminded patient to complete all injections, in which patient verbalized understanding.     Lap sites: No erythema, drainage, and/or swelling    DYLAN drain site: No drainage; dressing removed    BM: None to date, but is passing flatus.     Ambulation: Patient is walking throughout house every hour.    IS: Patient continues to use 10x's per hour while awake.  She has reached 2,000 mL.    Temperature: 97.6 degrees    Medications: (confirmed currently taking)   *Flintstones: yes   *Probiotic: had to order; will be delivered this Saturday   *Prilosec: yes    Questions: None    This RN reminded the patient to contact the office with any questions and/or concerns.  RN also reminded patient they will receive another follow-up TC prior to the two week post-op follow-up appointment.  Patient verbalized understanding to both.  Patient's two week post-op visit is scheduled and was confirmed.

## 2024-07-15 ENCOUNTER — TELEPHONE (OUTPATIENT)
Age: 29
End: 2024-07-15

## 2024-07-16 ENCOUNTER — TELEPHONE (OUTPATIENT)
Age: 29
End: 2024-07-16

## 2024-07-16 NOTE — TELEPHONE ENCOUNTER
This RN spoke with patient post-operatively.     Hydration: Patient hydrated with 55-70 ounces daily.    Nausea and/or vomitting: None    Pain: Currently managed without medication    Lovenox injections: Administering every 12 hours, rotating sites.  RN reminded patient to complete all injections, in which patient verbalized understanding.     Lap sites: No erythema, drainage, and/or swelling    DYLAN drain site: No drainage; dressing removed    BM: Every few days    Ambulation: Patient is walking throughout house every hour.    IS: Patient continues to use 10x's per hour while awake.  She has reached 2,000 mL.    Temperature: 97.5 degrees    Medications: (confirmed currently taking)   *Flintstones: yes   *Probiotic: yes   *Prilosec: yes    Questions: None    This RN reminded the patient to contact the office with any questions and/or concerns.  Patient verbalized understanding.  Patient's two week post-op visit is scheduled and was confirmed.

## 2024-07-18 ENCOUNTER — TELEPHONE (OUTPATIENT)
Facility: HOSPITAL | Age: 29
End: 2024-07-18

## 2024-07-18 NOTE — TELEPHONE ENCOUNTER
Pt contacted this RD today at 2:20 pm wanting to self-advance her diet.  Pt is approx. 9 days S/P laparoscopic sleeve gastrectomy procedure.  Reminded pt of the importance of her following the diet as previously educated and the risks associated with her self-advancing her diet. Pt was instructed to remain on the bariatric liquid diet until her provider appt scheduled for 7/23/24, where her dietary tolerance of the bariatric liquid diet will be assessed and then dietary instruction on the 2nd stage post-op diet will be provided by this RD.  Pt voiced comprehension.    RD: Minerva Verdugo, MS/ASA, RD

## 2024-07-23 ENCOUNTER — TELEPHONE (OUTPATIENT)
Age: 29
End: 2024-07-23

## 2024-07-23 ENCOUNTER — OFFICE VISIT (OUTPATIENT)
Age: 29
End: 2024-07-23

## 2024-07-23 ENCOUNTER — HOSPITAL ENCOUNTER (OUTPATIENT)
Facility: HOSPITAL | Age: 29
Discharge: HOME | End: 2024-07-26

## 2024-07-23 VITALS
TEMPERATURE: 96.8 F | BODY MASS INDEX: 47.09 KG/M2 | HEART RATE: 68 BPM | OXYGEN SATURATION: 100 % | WEIGHT: 293 LBS | DIASTOLIC BLOOD PRESSURE: 74 MMHG | HEIGHT: 66 IN | SYSTOLIC BLOOD PRESSURE: 148 MMHG

## 2024-07-23 DIAGNOSIS — Z09 POSTOPERATIVE EXAMINATION: Primary | ICD-10-CM

## 2024-07-23 PROCEDURE — 99024 POSTOP FOLLOW-UP VISIT: CPT | Performed by: NURSE PRACTITIONER

## 2024-07-23 RX ORDER — URSODIOL 500 MG/1
500 TABLET, FILM COATED ORAL DAILY
Qty: 30 TABLET | Refills: 4 | Status: SHIPPED | OUTPATIENT
Start: 2024-07-23

## 2024-07-23 RX ORDER — OMEPRAZOLE 20 MG/1
20 TABLET, DELAYED RELEASE ORAL DAILY
Qty: 30 TABLET | Refills: 1 | Status: SHIPPED | OUTPATIENT
Start: 2024-07-23

## 2024-07-23 RX ORDER — BACILLUS COAGULANS/INULIN 1B-250 MG
CAPSULE ORAL
COMMUNITY

## 2024-07-23 NOTE — TELEPHONE ENCOUNTER
Patient requested this RN send Prilosec to [facility], which is closer to her home, for when a refill is needed.  RN did so and informed patient.

## 2024-07-23 NOTE — PROGRESS NOTES
PCP Nicole Hughes NP    Asthma     Chronic back pain     Depression     GERD (gastroesophageal reflux disease)     diet controlled    Hypertension     pregnancy induced hypertension, no current medications    Ill-defined condition     positive tb skin tect X 2 in 2013    Morbid obesity (HCC)     Morbid obesity with BMI of 50.0-59.9, adult (HCC)     Prediabetes     Schizophrenia (HCC)     Scoliosis     Seasonal allergies     Wears glasses        Past Surgical History:   Procedure Laterality Date    SLEEVE GASTRECTOMY N/A 7/9/2024    1. LAPAROSCOPIC  SLEEVE GASTRECTOMY 2.OVERSEWN GASTRIC STAPLE LINE 3. LIVER WEDGE BIOPSY 4. INTRAOPERATIVE ENDOSCOPY performed by Tayo Rivero MD at [facility] MAIN OR    TOOTH EXTRACTION         Current Outpatient Medications   Medication Sig Dispense Refill    Pediatric Multivit-Minerals (FLINTSTONES COMPLETE PO) Take by mouth      Bacillus Coagulans-Inulin (PROBIOTIC) 1-250 BILLION-MG CAPS Take by mouth      ARIPiprazole (ABILIFY MAINTENA IM) Inject into the muscle      omeprazole (PRILOSEC OTC) 20 MG tablet Take 1 tablet by mouth daily 30 tablet 1    ondansetron (ZOFRAN-ODT) 8 MG TBDP disintegrating tablet Place 1 tablet under the tongue every 8 hours as needed for Nausea or Vomiting 30 tablet 0    QUEtiapine (SEROQUEL) 200 MG tablet Take 2 tablets by mouth nightly      albuterol sulfate HFA (PROVENTIL;VENTOLIN;PROAIR) 108 (90 Base) MCG/ACT inhaler Inhale 2 puffs into the lungs as needed       No current facility-administered medications for this visit.       Latex    Review of Symptoms:       General - No history or complaints of unexpected fever or chills  Head/Neck - No history or complaints of headache or dizziness  Cardiac - No history or complaints of chest pain, palpitations, or shortness of breath  Pulmonary - No history or complaints of shortness of breath or productive cough  Gastrointestinal - as noted above  Genitourinary - No history or complaints of hematuria/dysuria

## 2024-07-23 NOTE — PATIENT INSTRUCTIONS
Continue focus on hydration.  May advance to soft diet. Please review material in your binder. Remember - your motto is \"soft foods with protein\".    Eat regularly. Continue to use protein shakes.  Remember to eat slowly & not to drink fluids with your meals.  Increase activity as able - cardio (walking) only.  Continue to take multi-vitamins.  Continue regular follow-up with your PCP.  Return to office as scheduled for your next appointment.  Call the office if you have any questions or concerns.    You need to get more aggressive with your bowel regimen. Constipation is very common for several reasons including pain medications, protein shakes, decreased intake, etc.    The medications for constipation on this list are available over-the-counter at most drug or grocery stores.     GET THINGS MOVING   TAKE 1 capful or packet of Miralax (polyethylene glycol) mixed with at least 8 ounces of water or diet juice 2 times daily, AND / OR   TAKE 1 tablet of Senna-S (sennosides / docusate) 2 times daily.   Once you are regular, you may adjust as needed (for example, stop Senna-S and continue Miralax).   If you don't have a BM for a total of 3 days, move to Step 2.     2. KEEP THINGS MOVING   INCREASE Senna-S to 2 tablets 2 times daily, AND CONTINUE Miralax, taking 1 capful or packet mixed with at least 8 ounces of water or diet juice 2 times daily   Once you are regular, you may adjust as needed.   If you don't have a BM for a total of of 5 days, begin Step 3.     3. REALLY GET THINGS MOVING   ADD 1 dose (30 ml), of Milk of Magnesia (magnesium hydroxide).   If you are able to have a BM return to Step 2 until you are done using opioids or you have constipation or diarrhea. If you don't have a BM within 8 hours,     ADD 1 tablet (10 mg) of Dulcolax (bisacodyl) OR 1 rectal suppository.   If you are able to have a bowel movement return to Step 2.   If you don't have a BM,     TAKE another dose of Milk of Magnesia and 1 tablet

## 2024-07-23 NOTE — PROGRESS NOTES
Post-Op Nutrition Note  [facility]      Patient's Name: Geovanni Carter Age: 29 y.o.   YOB: 1995 Sex: female     Pt attended nutrition education class on advancing their WLS post-op diet to the Second Stage, Soft/Puree.  Reviewed diet progression for weeks 3-4.    Reviewed pp. 44-50 of the patient post-op education book.     Discussed: post-op diet progression, including soft/pureed high protein, low fat, low sugar food recommendations; proper food group choices;  consumption of food and liquids, and consumption of adequate no-sugar, no-caffeine, no carbonation liquids.    We reviewed appropriate food choices, meal adaptations (use of smaller dishes/utensils, mechanical pureeing of food, eat slowly and chewing sufficiently for digestion), cooking techniques, and eating behavior modifications.  Discussed intake regimen with 3 meals and 2-3 protein supplements per day.  Additionally, intake timing - to include consuming a meal or snack every 3-4 hrs, the 30:30 rule, and having a meal within 2 hours of waking . Reinforced the importance of continued vitamin & probiotic consumption, adequate fluid with goal of 64 oz per day and adequate protein with goal of  grams per day. Stressed the importance of 30 min of physical activity each day, as tolerated, with restricted lifting, to improve post op weight loss results and bowel function.      Pt voiced understanding through class discussion.  All nutrition-related questions answered. Reminded pt that a RD would be calling them again at their 1 mo. post-op lorna.  Pt provided with RD contact information for nutrition-related questions or concerns.    RD: Minerva Verdugo MS/SHANTANU BRAY

## 2024-08-12 ENCOUNTER — TELEPHONE (OUTPATIENT)
Facility: HOSPITAL | Age: 29
End: 2024-08-12

## 2024-08-12 ENCOUNTER — HOSPITAL ENCOUNTER (OUTPATIENT)
Facility: HOSPITAL | Age: 29
Discharge: HOME | End: 2024-08-15

## 2024-08-12 NOTE — TELEPHONE ENCOUNTER
8/12/2024:  Attempted to contact patient today at  10:30 am in reference to: assessment of post-op diet tolerance and to discuss diet and vitamin/mineral supplement progression, as pt is approx. 1 mo S/P laparoscopic sleeve gastrectomy procedure. Patient was left a voicemail to contact me.  My contact information was provided.       Minerva Verdugo MS/ASA, RD

## 2024-08-12 NOTE — PROGRESS NOTES
Post-Op Nutrition Note  [facility]     Patient is a 29 y.o. female approx. 1 mo S/P laparoscopic sleeve gastrectomy procedure.     There is no height or weight on file to calculate BMI.     Pt current has not weighed herself since she was in the clinic for her 2 wk post-op appt, visit was virtual.      Pre-op Wt: 323 lbs EBW: 168 lbs     Since pt has not weighed herself since she was in clinic I am unable to assess pts % EBWL.        Pt is currently on a soft/puree food diet without difficulty. Patient is hydrating with 64 ounces, daily. Reports eating 2 meals/d, portion sizes are approximately 3 oz meat and brussels sprouts, and meals take approximately 25 min to complete. Patient is tolerating the following sources of protein: chicken thigh, salmon (canned), tuna (canned), and eggs.     Protein shake intake: 2 protein supplement shakes/day.   [x] Premier (30 g/svg PRO)   []  Equate High Performance/Max (30 g PRO/svg)   []  Fairlife Nutrition Plan (30 g PRO/svg)   []  Muscle Milk Zero RTD (20 g PRO/svg)  []  Fairlife Core Power (26 g PRO/svg)   []  Muscle Milk Genuine Zero Sugar RTD (25 g PRO/svg)   []  Ensure Max protein (30 g PRO/svg)  []  Premier Clear (20 g PRO/svg)   []  [id number] ( g PRO/svg)  []  Isopure Zero Carb, unflavored (N/A scoops (25 g whey protein isolate/scoop) mixed with N/A oz. N/A).   []  Isopure Infusions (20 g PRO/svg, mixed with N/A )  []  Other: N/A     EXERCISE: Patient is not currently doing any planned physical activity for exercise, pt states that she is walking at a stroll pace.  Pt states that she tried to do sit ups approx. 2 wks ago, and her stomach hurt so she stopped, however pt notes that her abdomen is .  Recommended to pt that she not do core exercises, but increase her cardio exercises, as tolerated.         Patient [] has [x] has not had any readmissions, re-operations, complications, ED visits, nor [facility] at [facility] during her first post-op month.

## 2024-12-18 ENCOUNTER — TELEMEDICINE (OUTPATIENT)
Age: 29
End: 2024-12-18
Payer: MEDICAID

## 2024-12-18 VITALS — HEIGHT: 66 IN | BODY MASS INDEX: 44.2 KG/M2 | WEIGHT: 275 LBS

## 2024-12-18 DIAGNOSIS — Z98.84 S/P LAPAROSCOPIC SLEEVE GASTRECTOMY: ICD-10-CM

## 2024-12-18 DIAGNOSIS — K90.9 INTESTINAL MALABSORPTION, UNSPECIFIED TYPE: ICD-10-CM

## 2024-12-18 DIAGNOSIS — K90.9 INTESTINAL MALABSORPTION, UNSPECIFIED TYPE: Primary | ICD-10-CM

## 2024-12-18 PROCEDURE — 99214 OFFICE O/P EST MOD 30 MIN: CPT | Performed by: NURSE PRACTITIONER

## 2024-12-18 RX ORDER — VITAMIN B COMPLEX
1 CAPSULE ORAL EVERY MORNING
COMMUNITY

## 2024-12-18 RX ORDER — BACILLUS COAGULANS/INULIN 1B-250 MG
1 CAPSULE ORAL DAILY
COMMUNITY

## 2024-12-18 NOTE — PROGRESS NOTES
Subjective:     Geovanni Carter  is a 29 y.o. female who presents for follow-up about 5.5 months following laparoscopic sleeve gastrectomy.   Surgery related complication: NA.    She has lost a total of 48 pounds since surgery.  Body mass index is 44.41 kg/m²..  Loss of EBW 26%.  Not seen since her 2 week postop visit.    The patient presents today to assess their progress toward their weight loss goal & to address any issues that may be present:   She is tolerating solids without difficulty, reports rare vomiting if eats too fast and denies nausea, abdominal pain, difficulty swallowing, and reflux.    The patients diet choices have been reviewed today and counseling was given.      Fluid intake: good      Protein intake: occ shakes + food; fish, steak, eggs      Meals/day: 3    Taking is taking vitamins as recommended.    The patient's exercise level: not active.       Changes in her medical history and medications have been reviewed.      Comorbidities:    Hypertension: improved, no Rx  Diabetes: N/A  Obstructive Sleep Apnea: N/A   Hyperlipidemia: N/A  Stress Urinary Incontinence: N/A  Gastroesophageal Reflux:improved, on PPI  Weight related arthropathy:unchanged, chronic back pain      Patient Active Problem List   Diagnosis    Paresthesia    Asthma    Acid reflux    Chronic back pain    Anxiety    Hypertension    Seasonal allergies    Morbid obesity    Depression    Scoliosis    Schizophrenia (HCC)    Body mass index 50.0-59.9, adult    Morbid obesity with BMI of 50.0-59.9, adult    Intestinal malabsorption    S/P laparoscopic sleeve gastrectomy     Past Medical History:   Diagnosis Date    Acid reflux     avoids food triggers    Anxiety     Anxiety and depression     on meds abiify  PCP Nicole Hughes NP    Asthma     Chronic back pain     Depression     GERD (gastroesophageal reflux disease)     diet controlled    Hypertension     pregnancy induced hypertension, no current medications    Ill-defined  negative

## 2024-12-18 NOTE — PATIENT INSTRUCTIONS
[employer]it[employer] or My Fitness Pal Adrienne  80-90g protein  800-1000 calories   Keep carbs below 50g       Patient Instructions      Remember hydration goals - minimum of 64 ounces of liquids per day (dehydration is the number one reason for hospital readmission).  Continue to monitor carbohydrate and protein intake you need a minimum of  Grams of protein daily- remember to keep your total carbohydrates to 50 grams or less per day for best results.  Continue to work towards exercise goals - 60-90 minutes, 5 times a week minimum of deliberate, aerobic exercise is the ultimate goal with strength training 2 times each week. Refer to [employer] for  information.  Remember to take vitamins as directed.  Attend support group the 2nd Thursday of each month.  6.  Constipation: Milk of Magnesia is for immediate relief only.  Miralax is to be used every day if constipation is a chronic problem.    7.  Diarrhea: patients will occasionally develop lactose intolerance after surgery.  Check to see if your protein shake has whey in it.  If it does try a protein powder or drink that does not have whey and stop all yogurts, cheeses and milks to see if the diarrhea goes away.    8.  If you have had labs drawn.  We will only call you if you have abnormal results.  Otherwise you can access the lab results in \"[employer]\".  You will only need the access code the first time you sign on.    9.  Call us at [phone] or email us through \"[employer]\" with questions,     concerns or worsening of condition, we have someone on call 24 hours a day.  If you are unable to reach our office, you are to go to your Primary Care Physician or the Emergency Department.     NOTE TO GASTRIC BYPASS PATIENTS:  (SAME APPLIES TO GASTRIC SLEEVE PATIENTS FOR FIRST TWO MONTHS)  Remember that for the rest of your life, you are not able to take the following:  - NSAIDs (ibuprofen, goody powder, BC powder, Motrin, Advil, Mobic, Voltaren, Excedrin,

## 2024-12-25 LAB
25(OH)D3+25(OH)D2 SERPL-MCNC: 46.5 NG/ML (ref 30–100)
ALBUMIN SERPL-MCNC: 4.1 G/DL (ref 3.9–4.9)
ALP SERPL-CCNC: 77 IU/L (ref 44–121)
ALT SERPL-CCNC: 7 IU/L (ref 0–32)
AST SERPL-CCNC: 12 IU/L (ref 0–40)
BASOPHILS # BLD AUTO: 0 X10E3/UL (ref 0–0.2)
BASOPHILS NFR BLD AUTO: 0 %
BILIRUB SERPL-MCNC: 0.5 MG/DL (ref 0–1.2)
BUN SERPL-MCNC: 11 MG/DL (ref 6–20)
BUN/CREAT SERPL: 10 (ref 9–23)
CALCIUM SERPL-MCNC: 9.4 MG/DL (ref 8.7–10.2)
CHLORIDE SERPL-SCNC: 104 MMOL/L (ref 96–106)
CO2 SERPL-SCNC: 24 MMOL/L (ref 20–29)
CREAT SERPL-MCNC: 1.11 MG/DL (ref 0.57–1)
EOSINOPHIL # BLD AUTO: 0.1 X10E3/UL (ref 0–0.4)
EOSINOPHIL NFR BLD AUTO: 1 %
ERYTHROCYTE [DISTWIDTH] IN BLOOD BY AUTOMATED COUNT: 13.9 % (ref 11.7–15.4)
FERRITIN SERPL-MCNC: 52 NG/ML (ref 15–150)
FOLATE SERPL-MCNC: >20 NG/ML
GLOBULIN SER CALC-MCNC: 3 G/DL (ref 1.5–4.5)
GLUCOSE SERPL-MCNC: 101 MG/DL (ref 70–99)
HCT VFR BLD AUTO: 36.4 % (ref 34–46.6)
HGB BLD-MCNC: 11.3 G/DL (ref 11.1–15.9)
IMM GRANULOCYTES # BLD AUTO: 0 X10E3/UL (ref 0–0.1)
IMM GRANULOCYTES NFR BLD AUTO: 0 %
IRON SERPL-MCNC: 37 UG/DL (ref 27–159)
LYMPHOCYTES # BLD AUTO: 1.8 X10E3/UL (ref 0.7–3.1)
LYMPHOCYTES NFR BLD AUTO: 35 %
MCH RBC QN AUTO: 26.1 PG (ref 26.6–33)
MCHC RBC AUTO-ENTMCNC: 31 G/DL (ref 31.5–35.7)
MCV RBC AUTO: 84 FL (ref 79–97)
MONOCYTES # BLD AUTO: 0.4 X10E3/UL (ref 0.1–0.9)
MONOCYTES NFR BLD AUTO: 8 %
NEUTROPHILS # BLD AUTO: 2.8 X10E3/UL (ref 1.4–7)
NEUTROPHILS NFR BLD AUTO: 56 %
PLATELET # BLD AUTO: 272 X10E3/UL (ref 150–450)
POTASSIUM SERPL-SCNC: 4.3 MMOL/L (ref 3.5–5.2)
PROT SERPL-MCNC: 7.1 G/DL (ref 6–8.5)
RBC # BLD AUTO: 4.33 X10E6/UL (ref 3.77–5.28)
SODIUM SERPL-SCNC: 139 MMOL/L (ref 134–144)
SPECIMEN STATUS REPORT: NORMAL
VIT B12 SERPL-MCNC: 1208 PG/ML (ref 232–1245)
WBC # BLD AUTO: 5 X10E3/UL (ref 3.4–10.8)

## 2024-12-31 LAB — VIT B1 BLD-SCNC: 125.2 NMOL/L (ref 66.5–200)

## 2025-03-24 ENCOUNTER — TELEMEDICINE (OUTPATIENT)
Age: 30
End: 2025-03-24
Payer: MEDICAID

## 2025-03-24 VITALS — WEIGHT: 249 LBS | HEIGHT: 66 IN | BODY MASS INDEX: 40.02 KG/M2

## 2025-03-24 DIAGNOSIS — K90.9 INTESTINAL MALABSORPTION, UNSPECIFIED TYPE: Primary | ICD-10-CM

## 2025-03-24 DIAGNOSIS — Z3A.08 8 WEEKS GESTATION OF PREGNANCY: ICD-10-CM

## 2025-03-24 DIAGNOSIS — Z98.84 S/P LAPAROSCOPIC SLEEVE GASTRECTOMY: ICD-10-CM

## 2025-03-24 PROBLEM — E66.01 MORBID OBESITY WITH BMI OF 50.0-59.9, ADULT: Status: RESOLVED | Noted: 2024-07-09 | Resolved: 2025-03-24

## 2025-03-24 PROCEDURE — 99214 OFFICE O/P EST MOD 30 MIN: CPT | Performed by: NURSE PRACTITIONER

## 2025-03-24 NOTE — PROGRESS NOTES
or movement disorder noted  Extremities - normal movement  Musculoskeletal - moving extremities without difficulty  Skin - no rashes, no suspicious skin lesions noted             Labs:     CMP:   Lab Results   Component Value Date/Time     12/24/2024 11:16 AM    K 4.3 12/24/2024 11:16 AM     12/24/2024 11:16 AM    CO2 24 12/24/2024 11:16 AM    BUN 11 12/24/2024 11:16 AM    CREATININE 1.11 12/24/2024 11:16 AM    GLUCOSE 101 12/24/2024 11:16 AM    CALCIUM 9.4 12/24/2024 11:16 AM    BILITOT 0.5 12/24/2024 11:16 AM    AST 12 12/24/2024 11:16 AM    ALT 7 12/24/2024 11:16 AM      CBC:   Lab Results   Component Value Date/Time    WBC 5.0 12/24/2024 11:16 AM    RBC 4.33 12/24/2024 11:16 AM    HGB 11.3 12/24/2024 11:16 AM    HCT 36.4 12/24/2024 11:16 AM    MCV 84 12/24/2024 11:16 AM    MCH 26.1 12/24/2024 11:16 AM    MCHC 31.0 12/24/2024 11:16 AM    RDW 13.9 12/24/2024 11:16 AM     12/24/2024 11:16 AM    MPV 11.5 07/23/2022 02:16 PM      VITAMIN D:   Lab Results   Component Value Date/Time    VITD25 46.5 12/24/2024 11:16 AM     VITAMIN B12 AND FOLATE:   Lab Results   Component Value Date/Time    PBGGYSKL16 1208 12/24/2024 11:16 AM    FOLATE >20.0 12/24/2024 11:16 AM     IRON:   Lab Results   Component Value Date/Time    IRON 37 12/24/2024 11:16 AM     FERRITIN:   Lab Results   Component Value Date/Time    FERRITIN 52 12/24/2024 11:16 AM     VITAMIN B1:   Lab Results   Component Value Date/Time    ANGW8AZOTAD 125.2 12/24/2024 11:16 AM         Assessment and Plan:   Intestinal malabsorption  continue required Vitamins: B12, B complex, D, iron, calcium, multivitamin  S/p laparoscopic bariatric surgery,laparoscopic sleeve gastrectomy , history of morbid obesity. Reviewed weight loss progress and overall doing well. Will email her pregnancy handouts. She is aware to aim for 2000 calories a day and will have her meet with dietitian for additional reinforcement.  Sleep goal is 7-9 hours each night. Patient

## 2025-04-15 ENCOUNTER — HOSPITAL ENCOUNTER (OUTPATIENT)
Facility: HOSPITAL | Age: 30
Discharge: HOME | End: 2025-04-18

## 2025-04-15 VITALS — BODY MASS INDEX: 39.37 KG/M2 | WEIGHT: 245 LBS | HEIGHT: 66 IN

## 2025-04-15 NOTE — PROGRESS NOTES
Patient's Name: Geovanni Carter Age: 30 y.o.   YOB: 1995 Sex: female     Date:  4/15/2025    Ht 1.676 m (5' 6\")   Wt 111.1 kg (245 lb)   LMP 06/09/2024 (Exact Date)   BMI 39.54 kg/m²     Pre-Op Weight: 323 lbs.   EBW:  168 lbs.  Overall Pounds Lost: 78 lbs.  %EBWL:  46%    Surgery Date: 7/9/2024  Procedure: laparoscopic sleeve gastrectomy    Other Pertinent Information: Pt is ~9 mo post-op and ~11 weeks pregnant (EDC: 10/28/25) - OB appt on 5/15/25; getting WIC benefits and food stamps; residing in a temporary shelter through early May, able to procure/store groceries and prepare her own meals    Smoking:  [x] No [] Yes  Type/Frequency:     Alcohol Intake:  [x] No  [] Yes   Amount/Frequency:   drinks/d ,  times per week    Diet History    Patient's current diet habits include: reports tolerating freshly cooked foods but vomiting after eating leftovers;  reports avoiding sweets primarily but occasionally has a \"weak moment\" where she will eat candy (caramels, M&Ms, popcorn, [employer])  8:00 AM-9:30 AM - UP  8:00-9:30 AM - BREAKFAST: 2 eggs (ricky side up) OR cereal (Frosted Flakes) w/ milk (whole), OJ OR milk  11:00-11:30 AM - SNACK: watermelon  12:00-2:00 PM - LUNCH: 4 thin slices deli honey turkey breast (previously with bread but eliminated since provider visit)  Takes nap until 3-4:30 PM  5:00 PM - DINNER: lean chicken breast (3 oz cooked)  8:30 PM - SNACK: lean chicken breast (3 oz cooked)    FLUIDS: water, milk, sweet tea ~58 oz/d    Protein supplement intake: none (states has a case in her car but not currently drinking)    [x] Simple sugar intake  Type: candy occasionally      Vitamin/Mineral Supplements    [x] MVI:  Prenatal vitamin (getting from OB)  [x] Calcium citrate  [] Vitamin B-complex with at least 50 mg/d thiamine   [x] Vitamin B12: 1,000 mcg daily [] Sublingual  (has not been consistent in taking)  [] Vitamin D3: 3,000 IU per day  [] Other:        Exercise    Patient is currently

## 2025-05-27 ENCOUNTER — CLINICAL DOCUMENTATION (OUTPATIENT)
Facility: HOSPITAL | Age: 30
End: 2025-05-27

## (undated) DEVICE — ELECTRODE PT RET AD L9FT HI MOIST COND ADH HYDRGEL CORDED

## (undated) DEVICE — GLOVE ORANGE PI 7 1/2   MSG9075

## (undated) DEVICE — SHEARS LAP L45CM DIA5MM ULTRASONIC CRV TIP ADV HEMSTAS HARM

## (undated) DEVICE — SET TBNG DISP TIP FOR AHTO

## (undated) DEVICE — VISIGI 3D®  CALIBRATION SYSTEM  SIZE 36FR STD W/ BULB: Brand: BOEHRINGER® VISIGI 3D™ SLEEVE GASTRECTOMY CALIBRATION SYSTEM, SIZE 36FR W/BULB

## (undated) DEVICE — TUBING, SUCTION, 1/4" X 12', STRAIGHT: Brand: MEDLINE

## (undated) DEVICE — Device

## (undated) DEVICE — SUTURE ETHILON SZ 3-0 L30IN NONABSORBABLE BLK L30MM PSLX 3/8 1691H

## (undated) DEVICE — APPLICATOR LAP 35 CM 2 RIGID VISTASEAL

## (undated) DEVICE — SUTURE PDS II SZ 0 L27IN ABSRB VLT L26MM CT-2 1/2 CIR Z334H

## (undated) DEVICE — SUTURE MONOCRYL + SZ 4-0 L27IN ABSRB UD L19MM PS-2 3/8 CIR MCP426H

## (undated) DEVICE — SUTURE ETHIBOND EXCL BR GRN TAPR PT 2-0 30 X563H X563H

## (undated) DEVICE — SOLUTION SURG PREP 26 CC PURPREP

## (undated) DEVICE — TROCAR ENDOSCP L100MM DIA12MM STBL SL BLDELSS ENDOPATH XCEL

## (undated) DEVICE — SOLUTION IV LACTATED RINGERS INJECTION USP

## (undated) DEVICE — SLEEVE TRCR L100MM DIA5MM UNIV STBL FOR BLDELSS DIL TIP

## (undated) DEVICE — STRIP SKIN CLSR W1XL5IN NYLON REINF CURAD STERIL

## (undated) DEVICE — APPLIER CLP L SHFT DIA12MM 20 ROT MULT LIGACLP

## (undated) DEVICE — KIT SUTURING DEVICE M-CLOSE

## (undated) DEVICE — FORCEPS BX OVL CUP SERR DISP CAP L 240CM RAD JAW 4

## (undated) DEVICE — RELOAD STPL L60MM H1.5-3.6MM REG TISS BLU GRIPPING SURF B

## (undated) DEVICE — BARIATRIC: Brand: MEDLINE INDUSTRIES, INC.

## (undated) DEVICE — ENDOSCOPY PUMP TUBING/ CAP SET: Brand: ERBE

## (undated) DEVICE — SEALANT TISS 10 CC FOR HUM FIBRIN VISTASEAL

## (undated) DEVICE — GARMENT,MEDLINE,DVT,INT,CALF,MED, GEN2: Brand: MEDLINE

## (undated) DEVICE — STAPLER SKIN LN REINF 60 MM ECHELON ENDOPATH

## (undated) DEVICE — STAPLER 60MM POWERED ECHELON 3000 LONG 440MM

## (undated) DEVICE — SOLUTION IRRIG 500ML 0.9% SOD CHLO USP POUR PLAS BTL

## (undated) DEVICE — TROCAR LAP L100MM DIA5MM BLDELSS W/ STBL SL ENDOPATH XCEL

## (undated) DEVICE — TROCAR ENDOSCP BLDELSS 12X100 MM W/ HNDL STBL SL OPT TIP

## (undated) DEVICE — RELOAD STPL H4.1X2MM DIA60MM THCK TISS GRN 6 ROW PWR GST B